# Patient Record
Sex: FEMALE | Race: WHITE | NOT HISPANIC OR LATINO | Employment: PART TIME | URBAN - METROPOLITAN AREA
[De-identification: names, ages, dates, MRNs, and addresses within clinical notes are randomized per-mention and may not be internally consistent; named-entity substitution may affect disease eponyms.]

---

## 2018-12-29 ENCOUNTER — DOCUMENTATION (OUTPATIENT)
Dept: FAMILY MEDICINE CLINIC | Facility: CLINIC | Age: 63
End: 2018-12-29

## 2020-06-11 ENCOUNTER — HOSPITAL ENCOUNTER (EMERGENCY)
Facility: HOSPITAL | Age: 65
Discharge: HOME | End: 2020-06-11
Attending: EMERGENCY MEDICINE | Admitting: EMERGENCY MEDICINE

## 2020-06-11 ENCOUNTER — APPOINTMENT (OUTPATIENT)
Dept: GENERAL RADIOLOGY | Facility: HOSPITAL | Age: 65
End: 2020-06-11

## 2020-06-11 VITALS
HEART RATE: 93 BPM | HEIGHT: 66 IN | DIASTOLIC BLOOD PRESSURE: 111 MMHG | SYSTOLIC BLOOD PRESSURE: 181 MMHG | RESPIRATION RATE: 18 BRPM | OXYGEN SATURATION: 96 % | TEMPERATURE: 97.8 F | WEIGHT: 179.2 LBS | BODY MASS INDEX: 28.8 KG/M2

## 2020-06-11 DIAGNOSIS — F41.9 ANXIETY: ICD-10-CM

## 2020-06-11 DIAGNOSIS — R00.2 PALPITATIONS: Primary | ICD-10-CM

## 2020-06-11 LAB
ALBUMIN SERPL-MCNC: 4.2 G/DL (ref 3.5–5.2)
ALBUMIN/GLOB SERPL: 1.3 G/DL
ALP SERPL-CCNC: 83 U/L (ref 39–117)
ALT SERPL W P-5'-P-CCNC: 100 U/L (ref 1–33)
AMPHET+METHAMPHET UR QL: NEGATIVE
ANION GAP SERPL CALCULATED.3IONS-SCNC: 14.2 MMOL/L (ref 5–15)
AST SERPL-CCNC: 101 U/L (ref 1–32)
BARBITURATES UR QL SCN: NEGATIVE
BASOPHILS # BLD AUTO: 0.04 10*3/MM3 (ref 0–0.2)
BASOPHILS NFR BLD AUTO: 0.5 % (ref 0–1.5)
BENZODIAZ UR QL SCN: NEGATIVE
BILIRUB SERPL-MCNC: 0.5 MG/DL (ref 0.2–1.2)
BUN BLD-MCNC: 13 MG/DL (ref 8–23)
BUN/CREAT SERPL: 18.6 (ref 7–25)
CALCIUM SPEC-SCNC: 9 MG/DL (ref 8.6–10.5)
CANNABINOIDS SERPL QL: NEGATIVE
CHLORIDE SERPL-SCNC: 105 MMOL/L (ref 98–107)
CO2 SERPL-SCNC: 20.8 MMOL/L (ref 22–29)
COCAINE UR QL: NEGATIVE
CREAT BLD-MCNC: 0.7 MG/DL (ref 0.57–1)
DEPRECATED RDW RBC AUTO: 50.1 FL (ref 37–54)
EOSINOPHIL # BLD AUTO: 0.17 10*3/MM3 (ref 0–0.4)
EOSINOPHIL NFR BLD AUTO: 2.3 % (ref 0.3–6.2)
ERYTHROCYTE [DISTWIDTH] IN BLOOD BY AUTOMATED COUNT: 13.2 % (ref 12.3–15.4)
ETHANOL BLD-MCNC: <10 MG/DL (ref 0–10)
ETHANOL UR QL: <0.01 %
GFR SERPL CREATININE-BSD FRML MDRD: 84 ML/MIN/1.73
GLOBULIN UR ELPH-MCNC: 3.2 GM/DL
GLUCOSE BLD-MCNC: 129 MG/DL (ref 65–99)
HCT VFR BLD AUTO: 42.1 % (ref 34–46.6)
HGB BLD-MCNC: 14.2 G/DL (ref 12–15.9)
IMM GRANULOCYTES # BLD AUTO: 0.03 10*3/MM3 (ref 0–0.05)
IMM GRANULOCYTES NFR BLD AUTO: 0.4 % (ref 0–0.5)
LYMPHOCYTES # BLD AUTO: 1.3 10*3/MM3 (ref 0.7–3.1)
LYMPHOCYTES NFR BLD AUTO: 17.3 % (ref 19.6–45.3)
MCH RBC QN AUTO: 34.4 PG (ref 26.6–33)
MCHC RBC AUTO-ENTMCNC: 33.7 G/DL (ref 31.5–35.7)
MCV RBC AUTO: 101.9 FL (ref 79–97)
METHADONE UR QL SCN: NEGATIVE
MONOCYTES # BLD AUTO: 0.52 10*3/MM3 (ref 0.1–0.9)
MONOCYTES NFR BLD AUTO: 6.9 % (ref 5–12)
NEUTROPHILS # BLD AUTO: 5.44 10*3/MM3 (ref 1.7–7)
NEUTROPHILS NFR BLD AUTO: 72.6 % (ref 42.7–76)
NRBC BLD AUTO-RTO: 0 /100 WBC (ref 0–0.2)
OPIATES UR QL: NEGATIVE
OXYCODONE UR QL SCN: NEGATIVE
PLATELET # BLD AUTO: 269 10*3/MM3 (ref 140–450)
PMV BLD AUTO: 9.5 FL (ref 6–12)
POTASSIUM BLD-SCNC: 4.2 MMOL/L (ref 3.5–5.2)
PROT SERPL-MCNC: 7.4 G/DL (ref 6–8.5)
RBC # BLD AUTO: 4.13 10*6/MM3 (ref 3.77–5.28)
SODIUM BLD-SCNC: 140 MMOL/L (ref 136–145)
TROPONIN T SERPL-MCNC: <0.01 NG/ML (ref 0–0.03)
WBC NRBC COR # BLD: 7.5 10*3/MM3 (ref 3.4–10.8)
WHOLE BLOOD HOLD SPECIMEN: NORMAL

## 2020-06-11 PROCEDURE — 80053 COMPREHEN METABOLIC PANEL: CPT | Performed by: EMERGENCY MEDICINE

## 2020-06-11 PROCEDURE — 80307 DRUG TEST PRSMV CHEM ANLYZR: CPT | Performed by: EMERGENCY MEDICINE

## 2020-06-11 PROCEDURE — 93010 ELECTROCARDIOGRAM REPORT: CPT | Performed by: INTERNAL MEDICINE

## 2020-06-11 PROCEDURE — 84484 ASSAY OF TROPONIN QUANT: CPT | Performed by: EMERGENCY MEDICINE

## 2020-06-11 PROCEDURE — 71046 X-RAY EXAM CHEST 2 VIEWS: CPT

## 2020-06-11 PROCEDURE — 93005 ELECTROCARDIOGRAM TRACING: CPT | Performed by: EMERGENCY MEDICINE

## 2020-06-11 PROCEDURE — 90791 PSYCH DIAGNOSTIC EVALUATION: CPT | Performed by: SOCIAL WORKER

## 2020-06-11 PROCEDURE — 99283 EMERGENCY DEPT VISIT LOW MDM: CPT

## 2020-06-11 PROCEDURE — 85025 COMPLETE CBC W/AUTO DIFF WBC: CPT | Performed by: EMERGENCY MEDICINE

## 2020-06-11 PROCEDURE — 36415 COLL VENOUS BLD VENIPUNCTURE: CPT

## 2020-06-11 RX ORDER — LORAZEPAM 1 MG/1
0.5 TABLET ORAL ONCE
Status: COMPLETED | OUTPATIENT
Start: 2020-06-11 | End: 2020-06-11

## 2020-06-11 RX ADMIN — LORAZEPAM 0.5 MG: 1 TABLET ORAL at 13:01

## 2020-06-11 NOTE — ED PROVIDER NOTES
EMERGENCY DEPARTMENT ENCOUNTER    Room Number:  10/10  PCP: Rocío Cao MD  Historian: Patient  History Limited By: Nothing      HPI  Chief Complaint: Palpitations panic attack  Context: Emilia Squires is a 64 y.o. female who presents to the ED c/o palpitations and panic attack.  Patient states her air conditioner has been broken for a week and just found out that her electricity now does not work.  She states she has been increasingly stressed out.  Some mild palpitations and feels like her chest has tension.  Patient also experiencing the anniversary of her child's death.  No swelling in her legs.  No fevers.      Location: Central chest  Radiation: None  Character: Tension  Duration: 8:00  Severity: Mild  Progression: None  Aggravating Factors: Nothing   Alleviating Factors: Nothing        MEDICAL RECORD REVIEW    Has been seen here for cat bite in the past          PAST MEDICAL HISTORY  Active Ambulatory Problems     Diagnosis Date Noted   • No Active Ambulatory Problems     Resolved Ambulatory Problems     Diagnosis Date Noted   • No Resolved Ambulatory Problems     Past Medical History:   Diagnosis Date   • Acquired coagulation factor deficiency (CMS/HCC)    • Alcohol abuse    • Depression    • Diverticulitis    • Hypertension          PAST SURGICAL HISTORY  Past Surgical History:   Procedure Laterality Date   • ILEOSTOMY CLOSURE           FAMILY HISTORY  History reviewed. No pertinent family history.      SOCIAL HISTORY  Social History     Socioeconomic History   • Marital status:      Spouse name: Not on file   • Number of children: Not on file   • Years of education: Not on file   • Highest education level: Not on file   Tobacco Use   • Smoking status: Never Smoker   Substance and Sexual Activity   • Alcohol use: Not Currently   • Drug use: Never   • Sexual activity: Defer         ALLERGIES  Penicillins        REVIEW OF SYSTEMS  Review of Systems   Constitutional: Negative for fever.   HENT:  Negative for sore throat.    Eyes: Negative.    Respiratory: Positive for chest tightness. Negative for cough and shortness of breath.    Cardiovascular: Positive for palpitations. Negative for chest pain.   Gastrointestinal: Negative for abdominal pain, diarrhea and vomiting.   Genitourinary: Negative for dysuria.   Musculoskeletal: Negative for neck pain.   Skin: Negative for rash.   Allergic/Immunologic: Negative.    Neurological: Negative for weakness, numbness and headaches.   Hematological: Negative.    Psychiatric/Behavioral: The patient is nervous/anxious.    All other systems reviewed and are negative.           PHYSICAL EXAM  ED Triage Vitals   Temp Heart Rate Resp BP SpO2   06/11/20 1150 06/11/20 1150 06/11/20 1150 06/11/20 1220 06/11/20 1150   97.8 °F (36.6 °C) 103 18 (!) 192/108 96 %      Temp src Heart Rate Source Patient Position BP Location FiO2 (%)   06/11/20 1150 06/11/20 1150 -- -- --   Tympanic Monitor          Physical Exam   Constitutional: She is oriented to person, place, and time. No distress.   HENT:   Head: Normocephalic and atraumatic.   Eyes: Pupils are equal, round, and reactive to light. EOM are normal.   Neck: Normal range of motion. Neck supple.   Cardiovascular: Normal rate, regular rhythm and normal heart sounds.   Pulmonary/Chest: Effort normal and breath sounds normal. No respiratory distress.   Abdominal: Soft. There is no tenderness. There is no rebound and no guarding.   Musculoskeletal: Normal range of motion. She exhibits no edema.   Neurological: She is alert and oriented to person, place, and time. She has normal sensation and normal strength.   Skin: Skin is warm and dry. No rash noted.   Psychiatric: Mood and affect normal.   Nursing note and vitals reviewed.          LAB RESULTS  Recent Results (from the past 24 hour(s))   CBC Auto Differential    Collection Time: 06/11/20 12:49 PM   Result Value Ref Range    WBC 7.50 3.40 - 10.80 10*3/mm3    RBC 4.13 3.77 - 5.28  10*6/mm3    Hemoglobin 14.2 12.0 - 15.9 g/dL    Hematocrit 42.1 34.0 - 46.6 %    .9 (H) 79.0 - 97.0 fL    MCH 34.4 (H) 26.6 - 33.0 pg    MCHC 33.7 31.5 - 35.7 g/dL    RDW 13.2 12.3 - 15.4 %    RDW-SD 50.1 37.0 - 54.0 fl    MPV 9.5 6.0 - 12.0 fL    Platelets 269 140 - 450 10*3/mm3    Neutrophil % 72.6 42.7 - 76.0 %    Lymphocyte % 17.3 (L) 19.6 - 45.3 %    Monocyte % 6.9 5.0 - 12.0 %    Eosinophil % 2.3 0.3 - 6.2 %    Basophil % 0.5 0.0 - 1.5 %    Immature Grans % 0.4 0.0 - 0.5 %    Neutrophils, Absolute 5.44 1.70 - 7.00 10*3/mm3    Lymphocytes, Absolute 1.30 0.70 - 3.10 10*3/mm3    Monocytes, Absolute 0.52 0.10 - 0.90 10*3/mm3    Eosinophils, Absolute 0.17 0.00 - 0.40 10*3/mm3    Basophils, Absolute 0.04 0.00 - 0.20 10*3/mm3    Immature Grans, Absolute 0.03 0.00 - 0.05 10*3/mm3    nRBC 0.0 0.0 - 0.2 /100 WBC   Comprehensive Metabolic Panel    Collection Time: 06/11/20 12:50 PM   Result Value Ref Range    Glucose 129 (H) 65 - 99 mg/dL    BUN 13 8 - 23 mg/dL    Creatinine 0.70 0.57 - 1.00 mg/dL    Sodium 140 136 - 145 mmol/L    Potassium 4.2 3.5 - 5.2 mmol/L    Chloride 105 98 - 107 mmol/L    CO2 20.8 (L) 22.0 - 29.0 mmol/L    Calcium 9.0 8.6 - 10.5 mg/dL    Total Protein 7.4 6.0 - 8.5 g/dL    Albumin 4.20 3.50 - 5.20 g/dL    ALT (SGPT) 100 (H) 1 - 33 U/L    AST (SGOT) 101 (H) 1 - 32 U/L    Alkaline Phosphatase 83 39 - 117 U/L    Total Bilirubin 0.5 0.2 - 1.2 mg/dL    eGFR Non African Amer 84 >60 mL/min/1.73    Globulin 3.2 gm/dL    A/G Ratio 1.3 g/dL    BUN/Creatinine Ratio 18.6 7.0 - 25.0    Anion Gap 14.2 5.0 - 15.0 mmol/L   Troponin    Collection Time: 06/11/20 12:50 PM   Result Value Ref Range    Troponin T <0.010 0.000 - 0.030 ng/mL   Ethanol    Collection Time: 06/11/20 12:50 PM   Result Value Ref Range    Ethanol <10 0 - 10 mg/dL    Ethanol % <0.010 %   Light Blue Top    Collection Time: 06/11/20 12:50 PM   Result Value Ref Range    Extra Tube hold for add-on    Urine Drug Screen - Urine, Clean Catch     Collection Time: 06/11/20  1:02 PM   Result Value Ref Range    Amphet/Methamphet, Screen Negative Negative    Barbiturates Screen, Urine Negative Negative    Benzodiazepine Screen, Urine Negative Negative    Cocaine Screen, Urine Negative Negative    Opiate Screen Negative Negative    THC, Screen, Urine Negative Negative    Methadone Screen, Urine Negative Negative    Oxycodone Screen, Urine Negative Negative       Ordered the above labs and reviewed the results.        RADIOLOGY  XR Chest 2 View   Final Result   No acute process.       This report was finalized on 6/11/2020 1:38 PM by Dr. Danilo Hernández M.D.               Ordered the above noted radiological studies. Reviewed by me in PACS.          PROCEDURES  Procedures      EKG:          EKG time: 1302  Rhythm/Rate: Normal sinus rhythm 94 occasional PVC  P waves and RI: Normal P wave  QRS, axis: Normal QRS  ST and T waves: Normal ST-T waves    Interpreted Contemporaneously by me, independently viewed  Unchanged compared to prior 3/18/2013        MEDICATIONS GIVEN IN ER  Medications   LORazepam (ATIVAN) tablet 0.5 mg (0.5 mg Oral Given 6/11/20 1301)             PROGRESS AND CONSULTS  ED Course as of Jun 11 1535   Thu Jun 11, 2020   1520 15:20  Patient here for what she describes as a panic attack.  Patient does sound to be under significant stress.  Patient's work-up here is been normal.  Patient currently being seen by access.    [SL]   1532 15:32  Seen by access and she will be given outpatient information.    [SL]      ED Course User Index  [SL] Magdi Conway MD           MEDICAL DECISION MAKING      MDM  Number of Diagnoses or Management Options  Anxiety:   Palpitations:      Amount and/or Complexity of Data Reviewed  Clinical lab tests: ordered and reviewed (Troponin negative)  Tests in the radiology section of CPT®: ordered and reviewed (cxr negative)               DIAGNOSIS  Final diagnoses:   Palpitations   Anxiety            DISPOSITION  DISCHARGE    Patient discharged in stable condition.    Reviewed implications of results, diagnosis, meds, responsibility to follow up, warning signs and symptoms of possible worsening, potential complications and reasons to return to ER, including worsening chest pain, shortness of breath.    Patient/Family voiced understanding of above instructions.    Discussed plan for discharge, as there is no emergent indication for admission. Patient referred to primary care provider for BP management due to today's BP. Pt/family is agreeable and understands need for follow up and repeat testing.  Pt is aware that discharge does not mean that nothing is wrong but it indicates no emergency is present that requires admission and they must continue care with follow-up as given below or physician of their choice.     FOLLOW-UP  Rocío Cao MD  [address] [id number] [phone]    Schedule an appointment as soon as possible for a visit            Medication List      No changes were made to your prescriptions during this visit.             Latest Documented Vital Signs:  As of 15:35  BP- (!) 181/111 HR- 93 Temp- 97.8 °F (36.6 °C) (Tympanic) O2 sat- 96%                     Magdi Conway MD  06/11/20 3405

## 2020-06-11 NOTE — ED NOTES
"Pt to ED ambulatory with steady gait from home with c/o anxiety. 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 Does not like BP cuff on, told will intermittently check PRN. Pt reports also has been waking up at night and unable to sleep. Took husbands Rx celebrex this am with no relief. Pt asked to keep door open but encouraged to leave closed d/t anxiousness, did inform pt she could leave mask off ifno staff in the room and asked to put back in place during interactions. Pt agreed.   Dequanies JESSE, CP, coivd s/s.      Nancy Early, RN  20 1230    Patient was placed in face mask in first look. Patient was wearing facemask when I entered the room and throughout our encounter. I wore full protective equipment throughout this patient encounter including a face mask, eye shield, and gloves. Hand hygiene was performed before donning protective equipment and after removal when leaving the room.       Nancy Early RN  20 1230       Nancy Early RN  20 1231    "

## 2020-06-11 NOTE — ED NOTES
"Upon entering room pt was standing, still pacing, asked for something for nerves and states \"i'm gonna need to walk somewhere\". Pt informed unable to allow pt to leave room for safety and privacy issues but offered to allow pt to walk to bathroom to given urine specimen. But would otherwise have to stay in her room. Dr kinney notified of request for meds.      Nancy Early RN  06/11/20 [id number]    "

## 2020-06-11 NOTE — ED NOTES
"Pt presents to ED from home. Pt presents to ED with complaints of \" I think I had a panic attack this morning. My AC and Electricity have been out for a week.\" Pt states she has had a lot of increased stress at home and work. Pt denies SI/HI at this time and states she is also having difficulty sleeping. Pt is currently A&OX4, ambulated into triage, and in a mask.      Celia Dang, RN  06/11/20 5059    "

## 2020-06-11 NOTE — CONSULTS
65 y/o cauc  mother of 2 brought to the ED by her daughter as pt was afraid to drive. She states that she was more anxious and w/u at 0400 this am and could not go back to sleep after trying to do some chores around the house.  She denies any SI or HI.  She states she has struggled to deal w/ her sons drug OD 7 years ago.  She reports after that she began consuming wine more. She is up to 3 glasses at night. She states that her  has expressed concerns. She is also isolating more.  She has gained 40 lbs in that time periods as well.  She denies use of illicit drugs.  Patient denies hx of ETOH w/ drawal, blackouts.     Patient has seen SARIAH Larry in the past. She has been tried on Prozac, Effexor and felt that Pristiq work but quit taking. She has never seen a therapist.    Patient lives w/ her . He son OD'd on heroin. She and  have been  42 years.  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  This has become mundane for her.     Patient was alert and O x 4.  NO SI or HI.  No a/v hallucinations. Speech was appropriate.  Mood and affect were congrent.  Discussed coping skills and treatment options. She will contact Ms Cao's office and try to set up an appointment. She will also seek to get in to see a therapist to help her develop coping skills. Explored ways that the ETOH use can caused increased depression and delay the grief process.  Thus, emphasizing the need for therapy. She was appreciative of the information.  Discussed case w/ Dr. Conway.  She is being discharged.

## 2022-08-30 PROBLEM — I10 BENIGN ESSENTIAL HTN: Status: ACTIVE | Noted: 2022-08-30

## 2022-08-30 PROBLEM — IMO0001 BLUES: Status: ACTIVE | Noted: 2022-08-30

## 2022-08-30 PROBLEM — F10.10 AA (ALCOHOL ABUSE): Status: ACTIVE | Noted: 2022-08-30

## 2022-10-13 ENCOUNTER — HOSPITAL ENCOUNTER (OUTPATIENT)
Facility: HOSPITAL | Age: 67
Setting detail: OBSERVATION
Discharge: HOME | End: 2022-10-14
Attending: EMERGENCY MEDICINE | Admitting: EMERGENCY MEDICINE

## 2022-10-13 ENCOUNTER — APPOINTMENT (OUTPATIENT)
Dept: CT IMAGING | Facility: HOSPITAL | Age: 67
End: 2022-10-13

## 2022-10-13 ENCOUNTER — APPOINTMENT (OUTPATIENT)
Dept: GENERAL RADIOLOGY | Facility: HOSPITAL | Age: 67
End: 2022-10-13

## 2022-10-13 ENCOUNTER — OFFICE VISIT (OUTPATIENT)
Dept: INTERNAL MEDICINE | Facility: CLINIC | Age: 67
End: 2022-10-13

## 2022-10-13 ENCOUNTER — APPOINTMENT (OUTPATIENT)
Dept: ULTRASOUND IMAGING | Facility: HOSPITAL | Age: 67
End: 2022-10-13

## 2022-10-13 VITALS
HEART RATE: 134 BPM | OXYGEN SATURATION: 98 % | TEMPERATURE: 97.4 F | DIASTOLIC BLOOD PRESSURE: 80 MMHG | BODY MASS INDEX: 39.11 KG/M2 | SYSTOLIC BLOOD PRESSURE: 142 MMHG | HEIGHT: 55 IN | WEIGHT: 169 LBS

## 2022-10-13 DIAGNOSIS — E87.1 HYPONATREMIA: ICD-10-CM

## 2022-10-13 DIAGNOSIS — R93.89 ABNORMAL CT SCAN: ICD-10-CM

## 2022-10-13 DIAGNOSIS — R73.9 HYPERGLYCEMIA: ICD-10-CM

## 2022-10-13 DIAGNOSIS — R00.0 TACHYCARDIA: ICD-10-CM

## 2022-10-13 DIAGNOSIS — R73.09 ELEVATED GLUCOSE: Primary | ICD-10-CM

## 2022-10-13 DIAGNOSIS — I10 BENIGN ESSENTIAL HTN: ICD-10-CM

## 2022-10-13 DIAGNOSIS — E11.9 DIABETES MELLITUS, NEW ONSET: Primary | ICD-10-CM

## 2022-10-13 DIAGNOSIS — R10.84 GENERALIZED ABDOMINAL PAIN: ICD-10-CM

## 2022-10-13 DIAGNOSIS — R10.31 RLQ ABDOMINAL PAIN: ICD-10-CM

## 2022-10-13 LAB
ALBUMIN SERPL-MCNC: 4.3 G/DL (ref 3.5–5.2)
ALBUMIN/GLOB SERPL: 1.4 G/DL
ALP SERPL-CCNC: 91 U/L (ref 39–117)
ALT SERPL W P-5'-P-CCNC: 41 U/L (ref 1–33)
ANION GAP SERPL CALCULATED.3IONS-SCNC: 15.7 MMOL/L (ref 5–15)
AST SERPL-CCNC: 35 U/L (ref 1–32)
ATMOSPHERIC PRESS: 745.1 MMHG
BASE EXCESS BLDV CALC-SCNC: -1.9 MMOL/L (ref -2–2)
BASOPHILS # BLD AUTO: 0.04 10*3/MM3 (ref 0–0.2)
BASOPHILS NFR BLD AUTO: 0.5 % (ref 0–1.5)
BDY SITE: ABNORMAL
BILIRUB SERPL-MCNC: 0.7 MG/DL (ref 0–1.2)
BILIRUB UR QL STRIP: NEGATIVE
BUN SERPL-MCNC: 18 MG/DL (ref 8–23)
BUN/CREAT SERPL: 18.4 (ref 7–25)
CALCIUM SPEC-SCNC: 9.2 MG/DL (ref 8.6–10.5)
CHLORIDE SERPL-SCNC: 88 MMOL/L (ref 98–107)
CLARITY UR: ABNORMAL
CO2 SERPL-SCNC: 24.3 MMOL/L (ref 22–29)
COLOR UR: YELLOW
CREAT SERPL-MCNC: 0.98 MG/DL (ref 0.57–1)
DEPRECATED RDW RBC AUTO: 46.3 FL (ref 37–54)
EGFRCR SERPLBLD CKD-EPI 2021: 63.4 ML/MIN/1.73
EOSINOPHIL # BLD AUTO: 0.08 10*3/MM3 (ref 0–0.4)
EOSINOPHIL NFR BLD AUTO: 0.9 % (ref 0.3–6.2)
ERYTHROCYTE [DISTWIDTH] IN BLOOD BY AUTOMATED COUNT: 12.1 % (ref 12.3–15.4)
GLOBULIN UR ELPH-MCNC: 3 GM/DL
GLUCOSE BLDC GLUCOMTR-MCNC: 326 MG/DL (ref 70–130)
GLUCOSE BLDC GLUCOMTR-MCNC: 409 MG/DL (ref 70–130)
GLUCOSE SERPL-MCNC: 490 MG/DL (ref 65–99)
GLUCOSE UR STRIP-MCNC: ABNORMAL MG/DL
HBA1C MFR BLD: 10.2 % (ref 4.8–5.6)
HCO3 BLDV-SCNC: 24.7 MMOL/L (ref 22–28)
HCT VFR BLD AUTO: 49 % (ref 34–46.6)
HGB BLD-MCNC: 17 G/DL (ref 12–15.9)
HGB UR QL STRIP.AUTO: NEGATIVE
HOLD SPECIMEN: NORMAL
HOLD SPECIMEN: NORMAL
IMM GRANULOCYTES # BLD AUTO: 0.05 10*3/MM3 (ref 0–0.05)
IMM GRANULOCYTES NFR BLD AUTO: 0.6 % (ref 0–0.5)
KETONES UR QL STRIP: ABNORMAL
LEUKOCYTE ESTERASE UR QL STRIP.AUTO: NEGATIVE
LIPASE SERPL-CCNC: 63 U/L (ref 13–60)
LYMPHOCYTES # BLD AUTO: 1.53 10*3/MM3 (ref 0.7–3.1)
LYMPHOCYTES NFR BLD AUTO: 17.7 % (ref 19.6–45.3)
MCH RBC QN AUTO: 36.2 PG (ref 26.6–33)
MCHC RBC AUTO-ENTMCNC: 34.7 G/DL (ref 31.5–35.7)
MCV RBC AUTO: 104.3 FL (ref 79–97)
MODALITY: ABNORMAL
MONOCYTES # BLD AUTO: 0.78 10*3/MM3 (ref 0.1–0.9)
MONOCYTES NFR BLD AUTO: 9 % (ref 5–12)
NEUTROPHILS NFR BLD AUTO: 6.17 10*3/MM3 (ref 1.7–7)
NEUTROPHILS NFR BLD AUTO: 71.3 % (ref 42.7–76)
NITRITE UR QL STRIP: NEGATIVE
NRBC BLD AUTO-RTO: 0 /100 WBC (ref 0–0.2)
PCO2 BLDV: 47.2 MM HG (ref 41–51)
PH BLDV: 7.33 PH UNITS (ref 7.31–7.41)
PH UR STRIP.AUTO: <=5 [PH] (ref 5–8)
PLATELET # BLD AUTO: 318 10*3/MM3 (ref 140–450)
PMV BLD AUTO: 9.8 FL (ref 6–12)
PO2 BLDV: 16.6 MM HG (ref 35–45)
POTASSIUM SERPL-SCNC: 4.5 MMOL/L (ref 3.5–5.2)
PROT SERPL-MCNC: 7.3 G/DL (ref 6–8.5)
PROT UR QL STRIP: NEGATIVE
QT INTERVAL: 320 MS
RBC # BLD AUTO: 4.7 10*6/MM3 (ref 3.77–5.28)
SAO2 % BLDCOA: 20.2 % (ref 92–99)
SARS-COV-2 RNA RESP QL NAA+PROBE: NOT DETECTED
SODIUM SERPL-SCNC: 128 MMOL/L (ref 136–145)
SP GR UR STRIP: >=1.03 (ref 1–1.03)
TOTAL RATE: 18 BREATHS/MINUTE
TROPONIN T SERPL-MCNC: <0.01 NG/ML (ref 0–0.03)
TSH SERPL DL<=0.05 MIU/L-ACNC: 2.56 UIU/ML (ref 0.27–4.2)
UROBILINOGEN UR QL STRIP: ABNORMAL
WBC NRBC COR # BLD: 8.65 10*3/MM3 (ref 3.4–10.8)
WHOLE BLOOD HOLD COAG: NORMAL
WHOLE BLOOD HOLD SPECIMEN: NORMAL

## 2022-10-13 PROCEDURE — 76856 US EXAM PELVIC COMPLETE: CPT

## 2022-10-13 PROCEDURE — G0378 HOSPITAL OBSERVATION PER HR: HCPCS

## 2022-10-13 PROCEDURE — 74177 CT ABD & PELVIS W/CONTRAST: CPT

## 2022-10-13 PROCEDURE — 63710000001 INSULIN REGULAR HUMAN PER 5 UNITS: Performed by: PHYSICIAN ASSISTANT

## 2022-10-13 PROCEDURE — 99203 OFFICE O/P NEW LOW 30 MIN: CPT | Performed by: FAMILY MEDICINE

## 2022-10-13 PROCEDURE — U0003 INFECTIOUS AGENT DETECTION BY NUCLEIC ACID (DNA OR RNA); SEVERE ACUTE RESPIRATORY SYNDROME CORONAVIRUS 2 (SARS-COV-2) (CORONAVIRUS DISEASE [COVID-19]), AMPLIFIED PROBE TECHNIQUE, MAKING USE OF HIGH THROUGHPUT TECHNOLOGIES AS DESCRIBED BY CMS-2020-01-R: HCPCS | Performed by: PHYSICIAN ASSISTANT

## 2022-10-13 PROCEDURE — 99284 EMERGENCY DEPT VISIT MOD MDM: CPT

## 2022-10-13 PROCEDURE — 93010 ELECTROCARDIOGRAM REPORT: CPT | Performed by: INTERNAL MEDICINE

## 2022-10-13 PROCEDURE — 36415 COLL VENOUS BLD VENIPUNCTURE: CPT

## 2022-10-13 PROCEDURE — 82803 BLOOD GASES ANY COMBINATION: CPT

## 2022-10-13 PROCEDURE — 93005 ELECTROCARDIOGRAM TRACING: CPT

## 2022-10-13 PROCEDURE — 83036 HEMOGLOBIN GLYCOSYLATED A1C: CPT | Performed by: NURSE PRACTITIONER

## 2022-10-13 PROCEDURE — 81003 URINALYSIS AUTO W/O SCOPE: CPT | Performed by: PHYSICIAN ASSISTANT

## 2022-10-13 PROCEDURE — 71045 X-RAY EXAM CHEST 1 VIEW: CPT

## 2022-10-13 PROCEDURE — 80050 GENERAL HEALTH PANEL: CPT

## 2022-10-13 PROCEDURE — 93000 ELECTROCARDIOGRAM COMPLETE: CPT | Performed by: FAMILY MEDICINE

## 2022-10-13 PROCEDURE — 83690 ASSAY OF LIPASE: CPT | Performed by: PHYSICIAN ASSISTANT

## 2022-10-13 PROCEDURE — 84484 ASSAY OF TROPONIN QUANT: CPT | Performed by: PHYSICIAN ASSISTANT

## 2022-10-13 PROCEDURE — C9803 HOPD COVID-19 SPEC COLLECT: HCPCS

## 2022-10-13 PROCEDURE — 82962 GLUCOSE BLOOD TEST: CPT

## 2022-10-13 PROCEDURE — 25010000002 IOPAMIDOL 61 % SOLUTION: Performed by: EMERGENCY MEDICINE

## 2022-10-13 PROCEDURE — 96360 HYDRATION IV INFUSION INIT: CPT

## 2022-10-13 RX ORDER — SODIUM CHLORIDE 0.9 % (FLUSH) 0.9 %
10 SYRINGE (ML) INJECTION EVERY 12 HOURS SCHEDULED
Status: DISCONTINUED | OUTPATIENT
Start: 2022-10-13 | End: 2022-10-14 | Stop reason: HOSPADM

## 2022-10-13 RX ORDER — NITROGLYCERIN 0.4 MG/1
0.4 TABLET SUBLINGUAL
Status: DISCONTINUED | OUTPATIENT
Start: 2022-10-13 | End: 2022-10-14 | Stop reason: HOSPADM

## 2022-10-13 RX ORDER — SODIUM CHLORIDE 0.9 % (FLUSH) 0.9 %
10 SYRINGE (ML) INJECTION AS NEEDED
Status: DISCONTINUED | OUTPATIENT
Start: 2022-10-13 | End: 2022-10-14 | Stop reason: HOSPADM

## 2022-10-13 RX ORDER — ONDANSETRON 4 MG/1
4 TABLET, FILM COATED ORAL EVERY 6 HOURS PRN
Status: DISCONTINUED | OUTPATIENT
Start: 2022-10-13 | End: 2022-10-14 | Stop reason: HOSPADM

## 2022-10-13 RX ORDER — ONDANSETRON 2 MG/ML
4 INJECTION INTRAMUSCULAR; INTRAVENOUS EVERY 6 HOURS PRN
Status: DISCONTINUED | OUTPATIENT
Start: 2022-10-13 | End: 2022-10-14 | Stop reason: HOSPADM

## 2022-10-13 RX ADMIN — Medication 10 ML: at 21:50

## 2022-10-13 RX ADMIN — IOPAMIDOL 85 ML: 612 INJECTION, SOLUTION INTRAVENOUS at 18:59

## 2022-10-13 RX ADMIN — INSULIN HUMAN 6 UNITS: 100 INJECTION, SOLUTION PARENTERAL at 20:15

## 2022-10-13 RX ADMIN — SODIUM CHLORIDE 1000 ML: 9 INJECTION, SOLUTION INTRAVENOUS at 18:17

## 2022-10-13 RX ADMIN — SODIUM CHLORIDE, POTASSIUM CHLORIDE, SODIUM LACTATE AND CALCIUM CHLORIDE 1000 ML: 600; 310; 30; 20 INJECTION, SOLUTION INTRAVENOUS at 21:44

## 2022-10-13 NOTE — PROGRESS NOTES
"    Chief Complaint  Establish Care, Abdominal Pain, Blood Sugar Problem, and Anxiety    Subjective    History of Present Illness {CC  Problem List  Visit  Diagnosis   Encounters  Notes  Medications  Labs  Result Review Imaging  Media :23}     Emilia Squires presents to [facility] for   History of Present Illness     68 yo female present to establish care. She is New to me and this office. She previously seen by Dr. Rocío Cao a few years ago. She states she is only taking Actifed daily and flagyl given recently, not taking any other medication at this time.    She drinks 1 bottle of chardoney daily, drinking daily for years.   She denies previous history of high blood pressure.      She has had abdominal pain for the last couple of weeks.  She has history of diverticulitis.   Dull pain   No blood in stool or dark stools  No change in color or smell of urine  No nausea or vomitin.       Social History     Socioeconomic History   • Marital status:    Tobacco Use   • Smoking status: Never   • Smokeless tobacco: Never   Vaping Use   • Vaping Use: Never used   Substance and Sexual Activity   • Alcohol use: Yes     Alcohol/week: 2.0 - 3.0 standard drinks     Types: 2 - 3 Glasses of wine per week     Comment: daily wine   • Drug use: Never   • Sexual activity: Defer      Objective     Vital Signs:   /80   Pulse (!) 134   Temp 97.4 °F (36.3 °C)   Ht 63 cm (24.8\")   Wt 76.7 kg (169 lb)   SpO2 98%   .14 kg/m²   Physical Exam  Constitutional:       General: She is not in acute distress.     Appearance: She is not ill-appearing.   HENT:      Head: Normocephalic.      Mouth/Throat:      Mouth: Mucous membranes are moist.   Eyes:      Conjunctiva/sclera: Conjunctivae normal.   Cardiovascular:      Rate and Rhythm: Regular rhythm.      Heart sounds: Normal heart sounds.   Pulmonary:      Effort: No respiratory distress.      Breath sounds: Normal breath " sounds. No wheezing.   Abdominal:      General: There is no distension.      Palpations: Abdomen is soft.      Tenderness: There is abdominal tenderness. There is no guarding or rebound.      Comments: RL Q    Musculoskeletal:      Right lower leg: No edema.      Left lower leg: No edema.   Neurological:      Mental Status: She is alert.        Result Review  Data Reviewed:{ Labs  Result Review  Imaging  Med Tab  Media :23}   The following data was reviewed by: Lisa Rascon MD on 10/13/2022  No results for input(s): GLU, BUN, CREATININE, EGFRIFNONA, EGFRIFAFRI, NA, K, CL, CALCIUM, ALBUMIN, BILITOT, ALKPHOS, AST, ALT, CHLPL, TRIG, HDL, VLDL, LDL, LDLHDL, CKTOTAL, HGBA1C, MICROALBUR, WBC, RBC, HB, HCT, MCV, MCH, TSH, FREET4, PSA, INR, [id number], URICACID in the last 12555 hours.    Invalid input(s): PROTEINTOTREF, PLATELETS           Current Outpatient Medications:   •  Cholecalciferol (VITAMIN D3) 5000 UNITS capsule capsule, Take  by mouth., Disp: , Rfl:   •  Multiple Vitamin (MULTI VITAMIN DAILY PO), Take  by mouth., Disp: , Rfl:   •  Triprolidine-Pseudoephedrine (ACTIFED PO), Take  by mouth daily., Disp: , Rfl:       Assessment and Plan {CC Problem List  Visit Diagnosis  ROS  Review (Popup)  Health Maintenance  Quality  BestPractice  Medications  SmartSets  SnapShot Encounters  Media :23}   Problem List Items Addressed This Visit        Cardiac and Vasculature    Benign essential HTN    Relevant Orders    Comprehensive metabolic panel   Other Visit Diagnoses     Elevated glucose    -  Primary    Relevant Orders    Hemoglobin A1c    Generalized abdominal pain        Relevant Orders    Comprehensive metabolic panel    CBC No Differential    Urinalysis without microscopic (no culture) - Urine, Clean Catch    Lipase    Tachycardia        Relevant Orders    TSH Rfx On Abnormal To Free T4            SENT TO ER FOR FURTHER EVALUATION:  Elevated , no previous history of diabetes  Tachycardic- with  concern for infarct on EKG, pt denies chest pain.  Generalized Abdominal pain for the last 2 weeks.    Patient agreed best to go to ER to have additional evaluation  Will have come back in one week for follow up after discharge from hospital.       Follow Up   No follow-ups on file.  Patient was given instructions and counseling regarding her condition or for health maintenance advice. Please see specific information pulled into the AVS if appropriate.    EpicAct:[email or web address]_ORDERS,RunParams:STARTUPTYPE=FOLLOW    [email or web address]_DISCHARGE

## 2022-10-13 NOTE — ED NOTES
Pt arrived via wheelchair by nurse from the office. Pt states she went to the doctor today due to stomach issues and possible diabetes. States the office wanted her to come to the ER due to high heart rate and blood sugar. States it started about 3 weeks ago.    This RN wore N95 mask, gloves, eyewear and all other appropriate PPE while performing patient care.

## 2022-10-13 NOTE — ED PROVIDER NOTES
EMERGENCY DEPARTMENT ENCOUNTER    Room Number:  32/32  Date of encounter:  10/13/2022  PCP: Lisa Rascon MD  Historian: Patient  Full history not obtainable due to: None    HPI:  Chief Complaint: Fatigue    Context: Emilia Squires is a 67 y.o. female with a PMH significant for alcohol abuse, depression, hypertension, acquired coagulation factor deficiency who presents to the ED c/o fatigue.  The patient states for the past few weeks she has had increased fatigue and went to her PCP office today for further evaluation.  She was found to have an elevated blood sugar in the mid 300s and was found to have a heart rate in the 120s so she was sent here for further evaluation.  She admits to noticing some mild increase in urinary frequency but denies dysuria, hematuria.  No changes to bowel habits.  She does admit to some intermittent abdominal pain which is typically right lower quadrant.  Currently she feels only some mild discomfort to the abdomen.  Denies recent antibiotics or abdominal surgeries.      MEDICAL RECORD REVIEW:    Upon review of the medical record it appears the patient was evaluated earlier today in the office with her internal medicine provider for elevated blood glucose, hypertension, tachycardia      PAST MEDICAL HISTORY    Active Ambulatory Problems     Diagnosis Date Noted   • AA (alcohol abuse) 08/30/2022   • Benign essential HTN 08/30/2022   • Blues 08/30/2022   • Hyperlipidemia 04/19/2013     Resolved Ambulatory Problems     Diagnosis Date Noted   • No Resolved Ambulatory Problems     Past Medical History:   Diagnosis Date   • Acquired coagulation factor deficiency (HCC)    • Alcohol abuse    • Depression    • Diverticulitis    • Hypertension          PAST SURGICAL HISTORY  Past Surgical History:   Procedure Laterality Date   • ILEOSTOMY CLOSURE           FAMILY HISTORY  No family history on file.      SOCIAL HISTORY  Social History     Socioeconomic History   • Marital status:     Tobacco Use   • Smoking status: Never   • Smokeless tobacco: Never   Vaping Use   • Vaping Use: Never used   Substance and Sexual Activity   • Alcohol use: Yes     Alcohol/week: 2.0 - 3.0 standard drinks     Types: 2 - 3 Glasses of wine per week     Comment: daily wine   • Drug use: Never   • Sexual activity: Defer         ALLERGIES  Penicillins        REVIEW OF SYSTEMS    All systems reviewed and marked as negative except as listed in HPI     PHYSICAL EXAM    I have reviewed the triage vital signs and nursing notes.    ED Triage Vitals [10/13/22 1641]   Temp Heart Rate Resp BP SpO2   97.4 °F (36.3 °C) (!) 125 18 -- 98 %      Temp src Heart Rate Source Patient Position BP Location FiO2 (%)   Tympanic -- -- -- --       Physical Exam  Constitutional:       General: She is not in acute distress.     Appearance: She is well-developed.   HENT:      Head: Normocephalic and atraumatic.   Eyes:      General: No scleral icterus.     Conjunctiva/sclera: Conjunctivae normal.   Neck:      Trachea: No tracheal deviation.   Cardiovascular:      Rate and Rhythm: Regular rhythm. Tachycardia present.   Pulmonary:      Effort: Pulmonary effort is normal.      Breath sounds: Normal breath sounds.   Abdominal:      Palpations: Abdomen is soft.      Tenderness: There is abdominal tenderness in the right lower quadrant.   Musculoskeletal:         General: No deformity.      Cervical back: Normal range of motion.   Lymphadenopathy:      Cervical: No cervical adenopathy.   Skin:     General: Skin is warm and dry.   Neurological:      Mental Status: She is alert and oriented to person, place, and time.   Psychiatric:         Behavior: Behavior normal.         Vital signs and nursing notes reviewed.            LAB RESULTS  Recent Results (from the past 24 hour(s))   Comprehensive Metabolic Panel    Collection Time: 10/13/22  5:15 PM    Specimen: Blood   Result Value Ref Range    Glucose 490 (C) 65 - 99 mg/dL    BUN 18 8 - 23 mg/dL     Creatinine 0.98 0.57 - 1.00 mg/dL    Sodium 128 (L) 136 - 145 mmol/L    Potassium 4.5 3.5 - 5.2 mmol/L    Chloride 88 (L) 98 - 107 mmol/L    CO2 24.3 22.0 - 29.0 mmol/L    Calcium 9.2 8.6 - 10.5 mg/dL    Total Protein 7.3 6.0 - 8.5 g/dL    Albumin 4.30 3.50 - 5.20 g/dL    ALT (SGPT) 41 (H) 1 - 33 U/L    AST (SGOT) 35 (H) 1 - 32 U/L    Alkaline Phosphatase 91 39 - 117 U/L    Total Bilirubin 0.7 0.0 - 1.2 mg/dL    Globulin 3.0 gm/dL    A/G Ratio 1.4 g/dL    BUN/Creatinine Ratio 18.4 7.0 - 25.0    Anion Gap 15.7 (H) 5.0 - 15.0 mmol/L    eGFR 63.4 >60.0 mL/min/1.73   Green Top (Gel)    Collection Time: 10/13/22  5:15 PM   Result Value Ref Range    Extra Tube Hold for add-ons.    Lavender Top    Collection Time: 10/13/22  5:15 PM   Result Value Ref Range    Extra Tube hold for add-on    Gold Top - SST    Collection Time: 10/13/22  5:15 PM   Result Value Ref Range    Extra Tube Hold for add-ons.    Light Blue Top    Collection Time: 10/13/22  5:15 PM   Result Value Ref Range    Extra Tube Hold for add-ons.    CBC Auto Differential    Collection Time: 10/13/22  5:15 PM    Specimen: Blood   Result Value Ref Range    WBC 8.65 3.40 - 10.80 10*3/mm3    RBC 4.70 3.77 - 5.28 10*6/mm3    Hemoglobin 17.0 (H) 12.0 - 15.9 g/dL    Hematocrit 49.0 (H) 34.0 - 46.6 %    .3 (H) 79.0 - 97.0 fL    MCH 36.2 (H) 26.6 - 33.0 pg    MCHC 34.7 31.5 - 35.7 g/dL    RDW 12.1 (L) 12.3 - 15.4 %    RDW-SD 46.3 37.0 - 54.0 fl    MPV 9.8 6.0 - 12.0 fL    Platelets 318 140 - 450 10*3/mm3    Neutrophil % 71.3 42.7 - 76.0 %    Lymphocyte % 17.7 (L) 19.6 - 45.3 %    Monocyte % 9.0 5.0 - 12.0 %    Eosinophil % 0.9 0.3 - 6.2 %    Basophil % 0.5 0.0 - 1.5 %    Immature Grans % 0.6 (H) 0.0 - 0.5 %    Neutrophils, Absolute 6.17 1.70 - 7.00 10*3/mm3    Lymphocytes, Absolute 1.53 0.70 - 3.10 10*3/mm3    Monocytes, Absolute 0.78 0.10 - 0.90 10*3/mm3    Eosinophils, Absolute 0.08 0.00 - 0.40 10*3/mm3    Basophils, Absolute 0.04 0.00 - 0.20 10*3/mm3     Immature Grans, Absolute 0.05 0.00 - 0.05 10*3/mm3    nRBC 0.0 0.0 - 0.2 /100 WBC   Lipase    Collection Time: 10/13/22  5:15 PM    Specimen: Blood   Result Value Ref Range    Lipase 63 (H) 13 - 60 U/L   Troponin    Collection Time: 10/13/22  5:15 PM    Specimen: Blood   Result Value Ref Range    Troponin T <0.010 0.000 - 0.030 ng/mL   ECG 12 Lead    Collection Time: 10/13/22  6:01 PM   Result Value Ref Range    QT Interval 320 ms   COVID-19,[facility] CEPHE[facility]/MELINA NP SWAB IN TRANSPORT MEDIA 8-12 HR TAT - Swab, Nasopharynx    Collection Time: 10/13/22  6:09 PM    Specimen: Nasopharynx; Swab   Result Value Ref Range    COVID19 Not Detected Not Detected - Ref. Range   Blood Gas, Venous -    Collection Time: 10/13/22  7:47 PM    Specimen: Venous Blood   Result Value Ref Range    Site N/A     pH, Venous 7.328 7.310 - 7.410 pH Units    pCO2, Venous 47.2 41.0 - 51.0 mm Hg    pO2, Venous 16.6 (L) 35.0 - 45.0 mm Hg    HCO3, Venous 24.7 22.0 - 28.0 mmol/L    Base Excess, Venous -1.9 -2.0 - 2.0 mmol/L    O2 Saturation Calculated 20.2 (L) 92.0 - 99.0 %    Barometric Pressure for Blood Gas 745.1 mmHg    Modality Room Air     Rate 18 Breaths/minute       Ordered the above labs and independently reviewed the results.        RADIOLOGY  XR Chest 1 View    Result Date: 10/13/2022  XR CHEST 1 VW-  HISTORY:  Rapid heart rate, cough.  COMPARISON:  Chest radiograph 6/11/2020  FINDINGS:  A single view of the chest was obtained. The cardiac silhouette and mediastinal and hilar contours are not significantly changed. There is no focal consolidation or effusion. The visualized osseous structures are grossly similar.  This report was finalized on 10/13/2022 6:36 PM by Dr. Aurelia GUEVARA I ordered the above noted radiological studies. Independently reviewed by me and discussed with radiologist.  See dictation above for official radiology interpretation.      PROCEDURES    Procedures        MEDICATIONS GIVEN IN  ER    Medications   sodium chloride 0.9 % flush 10 mL (has no administration in time range)   insulin regular (humuLIN R,novoLIN R) injection 6 Units (has no administration in time range)   sodium chloride 0.9 % bolus 1,000 mL (1,000 mL Intravenous New Bag 10/13/22 1817)   iopamidol (ISOVUE-300) 61 % injection 100 mL (85 mL Intravenous Given 10/13/22 1859)         PROGRESS, DATA ANALYSIS, CONSULTS, AND MEDICAL DECISION MAKING    All labs have been independently reviewed by me.  All radiology studies have been reviewed by me.   EKG's independently reviewed by me.  Discussion below represents my analysis of pertinent findings related to patient's condition, differential diagnosis, treatment plan and final disposition.    DIFFERENTIAL DIAGNOSIS INCLUDE BUT NOT LIMITED TO:     Differential diagnosis includes but is not limited to:  - Hepatobiliary pathology such as cholecystitis, cholangitis, and symptomatic cholelithiasis  - Pancreatitis  - Dyspepsia  - Small bowel obstruction  - Appendicitis  - Diverticulitis  - UTI including pyelonephritis  - Ureteral stone  - Zoster  - Colitis, including infectious and ischemic  - Atypical ACS  - DKA  - New onset diabetes      ED Course as of 10/13/22 1958   Thu Oct 13, 2022   1810 Glucose(!!): 490 [DC]   1815 I viewed CXR on PACS system.  My interpretation is no pneumothorax. [DC]   1816 Sodium(!): 128 [DC]   1816 Chloride(!): 88 [DC]   1816 AST (SGOT)(!): 35 [DC]   1816 ALT (SGPT)(!): 41 [DC]   1816 Lipase(!): 63 [DC]   1948 I spoke with SARIAH Meadows with Obs Unit at this time regarding the patient.  I discussed work-up, results, concerns.  I discussed the consulting provider's desire for Obs Unit admit.     [DC]   1956 Preliminary result from radiologist reading the CT scan is that she has endometrial thickening and recommended nonemergent pelvic ultrasound to further evaluate. [DC]      ED Course User Index  [DC] Leoncio Ureña PA       AS OF 19:58 EDT VITALS:    BP -  155/98  HR - 107  TEMP - 97.4 °F (36.3 °C) (Tympanic)  02 SATS - 98%    1950 I rechecked the patient.  I discussed the patient's radiology findings (including all incidental findings), diagnosis, and plan for admission.  All questions answered.        DIAGNOSIS  Final diagnoses:   Diabetes mellitus, new onset (HCC)   Hyperglycemia   RLQ abdominal pain   Hyponatremia   Abnormal CT scan         DISPOSITION  Admit Obs    Pt masked in first look. I wore a surgical mask throughout my encounters with the pt. I performed hand hygiene on entry into the pt room and upon exit.     Dictated utilizing [employer] dictation     Note Disclaimer: At [facility], we believe that sharing information builds trust and better relationships. You are receiving this note because you recently visited [facility]. It is possible you will see health information before a provider has talked with you about it. This kind of information can be easy to misunderstand. To help you fully understand what it means for your health, we urge you to discuss this note with your provider.      Leoncio Ureña PA  10/13/22 1958

## 2022-10-13 NOTE — ED TRIAGE NOTES
Patient went to see Dr Skelton as a new patient today for concern of her home blood sugar running 140-200. Upon arrival to MD office she was tachycardic so they brought her here for eval    Mask placed on patient in triage. Triage staff wore appropriate PPE during interaction with patient.

## 2022-10-14 ENCOUNTER — READMISSION MANAGEMENT (OUTPATIENT)
Dept: CALL CENTER | Facility: HOSPITAL | Age: 67
End: 2022-10-14

## 2022-10-14 VITALS
BODY MASS INDEX: 28.12 KG/M2 | WEIGHT: 175 LBS | TEMPERATURE: 98 F | DIASTOLIC BLOOD PRESSURE: 80 MMHG | OXYGEN SATURATION: 99 % | RESPIRATION RATE: 16 BRPM | SYSTOLIC BLOOD PRESSURE: 147 MMHG | HEIGHT: 66 IN | HEART RATE: 95 BPM

## 2022-10-14 LAB
ALBUMIN SERPL-MCNC: 3 G/DL (ref 3.5–5.2)
ALBUMIN/GLOB SERPL: 1.4 G/DL
ALP SERPL-CCNC: 63 U/L (ref 39–117)
ALT SERPL W P-5'-P-CCNC: 28 U/L (ref 1–33)
ANION GAP SERPL CALCULATED.3IONS-SCNC: 10.5 MMOL/L (ref 5–15)
AST SERPL-CCNC: 22 U/L (ref 1–32)
BILIRUB SERPL-MCNC: 0.8 MG/DL (ref 0–1.2)
BUN SERPL-MCNC: 13 MG/DL (ref 8–23)
BUN/CREAT SERPL: 20.3 (ref 7–25)
CALCIUM SPEC-SCNC: 8.6 MG/DL (ref 8.6–10.5)
CHLORIDE SERPL-SCNC: 100 MMOL/L (ref 98–107)
CO2 SERPL-SCNC: 21.5 MMOL/L (ref 22–29)
CREAT SERPL-MCNC: 0.64 MG/DL (ref 0.57–1)
DEPRECATED RDW RBC AUTO: 46.3 FL (ref 37–54)
EGFRCR SERPLBLD CKD-EPI 2021: 97 ML/MIN/1.73
ERYTHROCYTE [DISTWIDTH] IN BLOOD BY AUTOMATED COUNT: 12.5 % (ref 12.3–15.4)
GLOBULIN UR ELPH-MCNC: 2.2 GM/DL
GLUCOSE BLDC GLUCOMTR-MCNC: 223 MG/DL (ref 70–130)
GLUCOSE BLDC GLUCOMTR-MCNC: 252 MG/DL (ref 70–130)
GLUCOSE BLDC GLUCOMTR-MCNC: 262 MG/DL (ref 70–130)
GLUCOSE BLDC GLUCOMTR-MCNC: 265 MG/DL (ref 70–130)
GLUCOSE BLDC GLUCOMTR-MCNC: 275 MG/DL (ref 70–130)
GLUCOSE SERPL-MCNC: 273 MG/DL (ref 65–99)
HCT VFR BLD AUTO: 38.5 % (ref 34–46.6)
HGB BLD-MCNC: 13.5 G/DL (ref 12–15.9)
MCH RBC QN AUTO: 36.1 PG (ref 26.6–33)
MCHC RBC AUTO-ENTMCNC: 35.1 G/DL (ref 31.5–35.7)
MCV RBC AUTO: 102.9 FL (ref 79–97)
PLATELET # BLD AUTO: 261 10*3/MM3 (ref 140–450)
PMV BLD AUTO: 10.5 FL (ref 6–12)
POTASSIUM SERPL-SCNC: 4.1 MMOL/L (ref 3.5–5.2)
PROT SERPL-MCNC: 5.2 G/DL (ref 6–8.5)
RBC # BLD AUTO: 3.74 10*6/MM3 (ref 3.77–5.28)
SODIUM SERPL-SCNC: 132 MMOL/L (ref 136–145)
WBC NRBC COR # BLD: 8.22 10*3/MM3 (ref 3.4–10.8)

## 2022-10-14 PROCEDURE — 82962 GLUCOSE BLOOD TEST: CPT

## 2022-10-14 PROCEDURE — 80053 COMPREHEN METABOLIC PANEL: CPT | Performed by: NURSE PRACTITIONER

## 2022-10-14 PROCEDURE — 63710000001 INSULIN LISPRO (HUMAN) PER 5 UNITS: Performed by: NURSE PRACTITIONER

## 2022-10-14 PROCEDURE — G0378 HOSPITAL OBSERVATION PER HR: HCPCS

## 2022-10-14 PROCEDURE — 85027 COMPLETE CBC AUTOMATED: CPT | Performed by: NURSE PRACTITIONER

## 2022-10-14 RX ORDER — INSULIN LISPRO 100 [IU]/ML
0-9 INJECTION, SOLUTION INTRAVENOUS; SUBCUTANEOUS
Status: DISCONTINUED | OUTPATIENT
Start: 2022-10-14 | End: 2022-10-14 | Stop reason: HOSPADM

## 2022-10-14 RX ORDER — NICOTINE POLACRILEX 4 MG
15 LOZENGE BUCCAL
Status: DISCONTINUED | OUTPATIENT
Start: 2022-10-14 | End: 2022-10-14 | Stop reason: HOSPADM

## 2022-10-14 RX ORDER — BLOOD-GLUCOSE METER
EACH MISCELLANEOUS
Qty: 1 KIT | Refills: 0 | Status: SHIPPED | OUTPATIENT
Start: 2022-10-14

## 2022-10-14 RX ORDER — DEXTROSE MONOHYDRATE 25 G/50ML
25 INJECTION, SOLUTION INTRAVENOUS
Status: DISCONTINUED | OUTPATIENT
Start: 2022-10-14 | End: 2022-10-14 | Stop reason: HOSPADM

## 2022-10-14 RX ORDER — SODIUM CHLORIDE, SODIUM LACTATE, POTASSIUM CHLORIDE, CALCIUM CHLORIDE 600; 310; 30; 20 MG/100ML; MG/100ML; MG/100ML; MG/100ML
125 INJECTION, SOLUTION INTRAVENOUS CONTINUOUS
Status: DISCONTINUED | OUTPATIENT
Start: 2022-10-14 | End: 2022-10-14 | Stop reason: HOSPADM

## 2022-10-14 RX ORDER — LANCETS 30 GAUGE
EACH MISCELLANEOUS
Qty: 100 EACH | Refills: 0 | Status: SHIPPED | OUTPATIENT
Start: 2022-10-14 | End: 2022-10-14 | Stop reason: SDUPTHER

## 2022-10-14 RX ORDER — LANCETS 30 GAUGE
EACH MISCELLANEOUS
Qty: 100 EACH | Refills: 0 | Status: SHIPPED | OUTPATIENT
Start: 2022-10-14

## 2022-10-14 RX ORDER — BLOOD-GLUCOSE METER
KIT MISCELLANEOUS
Qty: 1 EACH | Refills: 0 | Status: SHIPPED | OUTPATIENT
Start: 2022-10-14 | End: 2022-10-14 | Stop reason: SDUPTHER

## 2022-10-14 RX ORDER — GLIPIZIDE 5 MG/1
5 TABLET ORAL
Qty: 60 TABLET | Refills: 0 | Status: SHIPPED | OUTPATIENT
Start: 2022-10-14 | End: 2022-10-14 | Stop reason: SDUPTHER

## 2022-10-14 RX ORDER — GLIPIZIDE 5 MG/1
5 TABLET ORAL
Qty: 60 TABLET | Refills: 0 | Status: SHIPPED | OUTPATIENT
Start: 2022-10-14 | End: 2022-11-10 | Stop reason: SDUPTHER

## 2022-10-14 RX ADMIN — INSULIN LISPRO 4 UNITS: 100 INJECTION, SOLUTION INTRAVENOUS; SUBCUTANEOUS at 12:11

## 2022-10-14 RX ADMIN — SODIUM CHLORIDE, POTASSIUM CHLORIDE, SODIUM LACTATE AND CALCIUM CHLORIDE 125 ML/HR: 600; 310; 30; 20 INJECTION, SOLUTION INTRAVENOUS at 03:00

## 2022-10-14 RX ADMIN — INSULIN LISPRO 6 UNITS: 100 INJECTION, SOLUTION INTRAVENOUS; SUBCUTANEOUS at 08:37

## 2022-10-14 RX ADMIN — Medication 10 ML: at 08:40

## 2022-10-14 NOTE — OUTREACH NOTE
Prep Survey    Flowsheet Row Responses   [facility] patient discharged from? [facility]   Is LACE score < 7 ? Yes   Emergency Room discharge w/ pulse ox? No   Eligibility [facility]   Date of Admission 10/13/22   Date of Discharge 10/14/22   Discharge Disposition Home or Self Care   Discharge diagnosis Hyperglycemia   Does the patient have one of the following disease processes/diagnoses(primary or secondary)? Other   Does the patient have Home health ordered? No   Is there a DME ordered? No   Prep survey completed? Yes          TAMERA KUMARI - Registered Nurse

## 2022-10-14 NOTE — ED NOTES
Nursing report ED to floor  Emilia Squires  67 y.o.  female    HPI :   Chief Complaint   Patient presents with    Rapid Heart Rate       Admitting doctor:   Heath Adam MD    Admitting diagnosis:   The primary encounter diagnosis was Diabetes mellitus, new onset (HCC). Diagnoses of Hyperglycemia, RLQ abdominal pain, Hyponatremia, and Abnormal CT scan were also pertinent to this visit.    Code status:   Current Code Status       Date Active Code Status Order ID Comments User Context       10/13/2022 1959 CPR (Attempt to Resuscitate) [id number]  Prabhakar Grossman APRN ED        Question Answer    Code Status (Patient has no pulse and is not breathing) CPR (Attempt to Resuscitate)    Medical Interventions (Patient has pulse or is breathing) Full Support    Level Of Support Discussed With Patient                    Allergies:   Penicillins    Isolation:   Enhanced Droplet/Contact     Intake and Output  No intake or output data in the 24 hours ending 10/13/22 2013    Weight:       10/13/22  1805   Weight: 76.7 kg (169 lb)       Most recent vitals:   Vitals:    10/13/22 1801 10/13/22 1805 10/13/22 1831 10/13/22 1931   BP: 155/89  169/89 155/98   Pulse: 118  113 107   Resp:       Temp:       TempSrc:       SpO2: 99%  99% 98%   Weight:  76.7 kg (169 lb)         Active LDAs/IV Access:   Lines, Drains & Airways       Active LDAs       Name Placement date Placement time Site Days    Peripheral IV 10/13/22 1816 Right Wrist 10/13/22  1816  Wrist  less than 1                    Labs (abnormal labs have a star):   Labs Reviewed   COMPREHENSIVE METABOLIC PANEL - Abnormal; Notable for the following components:       Result Value    Glucose 490 (*)     Sodium 128 (*)     Chloride 88 (*)     ALT (SGPT) 41 (*)     AST (SGOT) 35 (*)     Anion Gap 15.7 (*)     All other components within normal limits    Narrative:     GFR Normal >60  Chronic Kidney Disease <60  Kidney Failure <15     CBC WITH AUTO DIFFERENTIAL - Abnormal;  Notable for the following components:    Hemoglobin 17.0 (*)     Hematocrit 49.0 (*)     .3 (*)     MCH 36.2 (*)     RDW 12.1 (*)     Lymphocyte % 17.7 (*)     Immature Grans % 0.6 (*)     All other components within normal limits   LIPASE - Abnormal; Notable for the following components:    Lipase 63 (*)     All other components within normal limits   BLOOD GAS, VENOUS - Abnormal; Notable for the following components:    pO2, Venous 16.6 (*)     O2 Saturation Calculated 20.2 (*)     All other components within normal limits   COVID-19,[facility]-HOUSE CEPHEID/MELINA, NP SWAB IN TRANSPORT MEDIA 8-12 HR TAT - Normal    Narrative:     Fact sheet for providers: https://www.fda.gov/media/821177/download     Fact sheet for patients: https://www.fda.gov/media/260064/download   TROPONIN (IN-HOUSE) - Normal    Narrative:     Troponin T Reference Range:  <= 0.03 ng/mL-   Negative for AMI  >0.03 ng/mL-     Abnormal for myocardial necrosis.  Clinicians would have to utilize clinical acumen, EKG, Troponin and serial changes to determine if it is an Acute Myocardial Infarction or myocardial injury due to an underlying chronic condition.       Results may be falsely decreased if patient taking Biotin.     RAINBOW DRAW    Narrative:     The following orders were created for panel order [place] Draw.  Procedure                               Abnormality         Status                     ---------                               -----------         ------                     Green Top (Gel)[[id number]]                                  Final result               Lavender Top[[id number]]                                     Final result               Gold Top - SST[[id number]]                                   Final result               Light Blue Top[[id number]]                                   Final result                 Please view results for these tests on the individual orders.   BLOOD GAS, VENOUS   URINALYSIS W/ CULTURE IF  INDICATED   POCT GLUCOSE FINGERSTICK   POCT GLUCOSE FINGERSTICK   POCT GLUCOSE FINGERSTICK   POCT GLUCOSE FINGERSTICK   POCT GLUCOSE FINGERSTICK   POCT GLUCOSE FINGERSTICK   POCT GLUCOSE FINGERSTICK   POCT GLUCOSE FINGERSTICK   POCT GLUCOSE FINGERSTICK   POCT GLUCOSE FINGERSTICK   POCT GLUCOSE FINGERSTICK   POCT GLUCOSE FINGERSTICK   POCT GLUCOSE FINGERSTICK   CBC AND DIFFERENTIAL    Narrative:     The following orders were created for panel order CBC & Differential.  Procedure                               Abnormality         Status                     ---------                               -----------         ------                     CBC Auto Differential[[id number]]        Abnormal            Final result                 Please view results for these tests on the individual orders.   GREEN TOP   LAVENDER TOP   GOLD TOP - SST   LIGHT BLUE TOP       EKG:   ECG 12 Lead   Preliminary Result   HEART RATE= 118  bpm   RR Interval= 508  ms   MS Interval= 168  ms   P Horizontal Axis= 19  deg   P Front Axis= 59  deg   QRSD Interval= 87  ms   QT Interval= 320  ms   QRS Axis= -8  deg   T Wave Axis= 63  deg   - OTHERWISE NORMAL ECG -   Sinus tachycardia   Electronically Signed By:    Date and Time of Study: 2022-10-13 18:01:19          Meds given in ED:   Medications   sodium chloride 0.9 % flush 10 mL (has no administration in time range)   insulin regular (humuLIN R,novoLIN R) injection 6 Units (has no administration in time range)   nitroglycerin (NITROSTAT) SL tablet 0.4 mg (has no administration in time range)   sodium chloride 0.9 % flush 10 mL (has no administration in time range)   sodium chloride 0.9 % flush 10 mL (has no administration in time range)   ondansetron (ZOFRAN) tablet 4 mg (has no administration in time range)     Or   ondansetron (ZOFRAN) injection 4 mg (has no administration in time range)   sodium chloride 0.9 % bolus 1,000 mL (1,000 mL Intravenous New Bag 10/13/22 1817)   iopamidol (ISOVUE-300) 61  % injection 100 mL (85 mL Intravenous Given 10/13/22 2999)       Imaging results:  CT Abdomen Pelvis With Contrast    Result Date: 10/13/2022   1.  Endometrial thickening and/or fluid within the endometrial cavity, greater than expected for patient age. Recommend further evaluation with pelvic ultrasound. 2.  Hepatic steatosis.  Discussed with ROSS Nam at 7:57 PM.  This report was finalized on 10/13/2022 7:57 PM by Dr. Aurelia Parr M.D.       Ambulatory status:   Stand by    Social issues:   Social History     Socioeconomic History    Marital status:    Tobacco Use    Smoking status: Never    Smokeless tobacco: Never   Vaping Use    Vaping Use: Never used   Substance and Sexual Activity    Alcohol use: Yes     Alcohol/week: 2.0 - 3.0 standard drinks     Types: 2 - 3 Glasses of wine per week     Comment: daily wine    Drug use: Never    Sexual activity: Defer       NIH Stroke Scale:         Dayanna Sanchez RN  10/13/22 20:13 EDT

## 2022-10-14 NOTE — NURSING NOTE
Note DM ed order for new dx. Meet with 68 y/o at bedside to assess needs for DM ed. Pt knows how to check BGs as she has been checking BGs at home w/her spouse's meter- who has DM. Pt is asking about getting a BG sensor/CGM. This RN communicated w/our pharmacist about pt's coverage for a sensor. Pt reports her spouse has a Addison. Pt's co-pay for Addison/supplies would be $75/mo. Pt does not appear amenable to pay this for a sensor. In the meantime, I have placed the order set for BG meter/supply rxs for dc. They need to be signed off by provider. Note to follow. Thank you.

## 2022-10-14 NOTE — NURSING NOTE
"Diabetes Education  Assessment/Teaching    Patient Name:  Emilia Squires  YOB: 1955  MRN: [id number]  Admit Date:  10/13/2022      Assessment Date:  10/14/2022  Flowsheet Row Most Recent Value   General Information     Referral From: MD petty. Meet with 66 y/o at bedside in obs unit to initiate DM ed. Pt reports her spouse has DM and has not attended classes. Encourage pt to ask PCP for referral to DM ed here for benefit of both pt and her spouse.    Height 167.6 cm (66\")   Height Method Stated   Weight 79.4 kg (175 lb)   Weight Method Bed scale   Diabetes History    What type of diabetes do you have? Type 2   Length of Diabetes Diagnosis Newly diagnosed this admission.    Current DM knowledge -- pt reports her spouse has (T2) DM.   Do you test your blood sugar at home? yes   Have you had high blood sugar? (>140mg/dl) yes -current a1c >10.   Education Preferences    Barriers to Learning -- somewhat low health literacy.   Nutrition Information    Assessment Topics    Healthy Eating - Assessment Needs education   Taking Medication - Assessment Needs education -anticipate pt to dc home later today w/oral DM tx per provider.   Problem Solving - Assessment Needs education   Healthy Coping - Assessment Competent   Monitoring - Assessment Competent   DM Goals    Monitoring - Goal 0-7 days from discharge   Contact Plan Follow-up medical care          Flowsheet Row Most Recent Value   DM Education Needs    Meter Needs own meter.   Meter Type -- DM educator initiated order set for BG meter/supply rxs for dc.   Medication Oral -advise pt of importance to take medication consistently/each day.   Problem Solving Hypoglycemia, Signs, Treatment   Healthy Eating -- D/w pt some basic concepts of healthy eating for DM. pt states she does not drink sodas.   Healthy Coping Appropriate   Discharge Plan Home, Follow-up with PCP   Motivation Engaged   Teaching Method Explanation, Discussion, Handouts   Patient Response " Needs reinforcement, Verbalized understanding      Other Comments:   Electronically signed by:  Odalis Funez RN, BSN, [facility]  10/14/22 13:49 EDT

## 2022-10-14 NOTE — PROGRESS NOTES
ED OBSERVATION PROGRESS/DISCHARGE SUMMARY    Date of Admission: 10/13/2022   LOS: 0 days   PCP: Lisa Rascon MD    Final Diagnosis new onset type 2 diabetes      Subjective     Hospital Outcome:   Patient is a pleasant afebrile ambulatory 67-year-old  female admitted to the observation unit for new onset type 2 diabetes.  She presented with some abdominal discomfort in the ER yesterday, this morning this has resolved.  Her blood sugar last night was in the 400s, this morning is improved to the 200s.  Her CT abdomen pelvis in the emergency department showed some uterine abnormality, her pelvic ultrasound this morning shows some mild endometrial thickening.  Patient reports her mom has a history of endometrial cancer.  I discussed this at length with her, plan to follow-up with her OB/GYN as an outpatient for further evaluation and testing as needed.  Patient is agreeable to plan.    She was seen and evaluated by Rocío robbins diabetic educator who advises that patient has tolerated glipizide well previously and would like to be back on that. She will assist patient in obtaining a glucometer. I have spoken with pharmacist who recommended Ozempic as this is covered well by her insurance, patient states she would prefer to start glipizide only and follow-up with her family doctor to see if she needs to be on Ozempic.    ROS:  General: no fevers, chills  Respiratory: no cough, dyspnea  Cardiovascular: no chest pain, palpitations  Abdomen: No abdominal pain, nausea, vomiting, or diarrhea  Neurologic: No focal weakness    Objective   Physical Exam:  I have reviewed the vital signs.  Temp:  [97.4 °F (36.3 °C)-98 °F (36.7 °C)] 98 °F (36.7 °C)  Heart Rate:  [] 93  Resp:  [16-22] 16  BP: (142-179)/(80-98) 160/88  General Appearance:    Alert, cooperative, no distress  Head:    Normocephalic, atraumatic  Eyes:    Sclerae anicteric  Neck:   Supple, no mass  Lungs: Clear to auscultation bilaterally,  respirations unlabored  Heart: Regular rate and rhythm, S1 and S2 normal, no murmur, rub or gallop  Abdomen:  Soft, non-tender, no guarding or rebound bowel sounds active, nondistended  Extremities: No clubbing, cyanosis, or edema to lower extremities  Pulses:  2+ and symmetric in distal lower extremities  Skin: No rashes   Neurologic: Oriented x3, Normal strength to extremities    Results Review:    I have reviewed the labs, radiology results and diagnostic studies.    Results from last 7 days   Lab Units 10/14/22  0509   WBC 10*3/mm3 8.22   HEMOGLOBIN g/dL 13.5   HEMATOCRIT % 38.5   PLATELETS 10*3/mm3 261     Results from last 7 days   Lab Units 10/14/22  0509 10/13/22  1715   SODIUM mmol/L 132* 128*   POTASSIUM mmol/L 4.1 4.5   CHLORIDE mmol/L 100 88*   CO2 mmol/L 21.5* 24.3   BUN mg/dL 13 18   CREATININE mg/dL 0.64 0.98   CALCIUM mg/dL 8.6 9.2   BILIRUBIN mg/dL 0.8 0.7   ALK PHOS U/L 63 91   ALT (SGPT) U/L 28 41*   AST (SGOT) U/L 22 35*   GLUCOSE mg/dL 273* 490*     Imaging Results (Last 24 Hours)     Procedure Component Value Units Date/Time    US Pelvis Complete [[id number]] Collected: 10/14/22 0001     Updated: 10/14/22 0001    Narrative:        Patient: WILLIE PATRICIA  Time Out: 00:00  Exam(s): US PELVIS     EXAM:    US Pelvis Transabdominal, Complete    CLINICAL HISTORY:     Reason for exam: Endometrial thickening and or fluid within the   endometrial cavity.    TECHNIQUE:    Real-time complete transabdominal pelvic ultrasound with image   documentation.    COMPARISON:    Abdomen and pelvis CT with IV contrast, 10 13 2022 at 1855 hrs.    FINDINGS:    Uterus cervix:  Uterus measures 7.2 x 4.0 x 4.2 cm.  The endometrium   measures 7 mm with trace fluid in the endometrial cavity.  No myometrial   mass.    Right ovary:  The ovaries are not well seen due to patient age and   bowel gas artifact.    Left ovary:  See above.    Free fluid:  No pelvic free fluid or adnexal mass.    Bladder:  Unremarkable as  visualized.  Wall is normal thickness for   degree of distention.    IMPRESSION:         Mild endometrial thickening to 7 mm with a trace amount of fluid in the   endometrial canal.      Impression:          Electronically signed by Elsa Arnett MD on 10-14-22 at 0000    CT Abdomen Pelvis With Contrast [[id number]] Collected: 10/13/22 1952     Updated: 10/13/22 2000    Narrative:      CT ABDOMEN PELVIS W CONTRAST-     HISTORY:  Right lower quadrant pain.      TECHNIQUE:  CT of the abdomen and pelvis was performed following the  administration of intravenous contrast.  Radiation dose reduction  techniques were utilized, including automated exposure control and  exposure modulation based on body size.     COMPARISON:  CT abdomen and pelvis 3/20/2013     FINDINGS:     There is lipomatous hypertrophy of the interatrial septum. There is no  pericardial effusion. There is mild bibasilar atelectasis and/or  scarring. Pleural spaces are clear.  The liver is normal in size. There is diffuse hepatic steatosis with  areas of focal fatty sparing. There is cholelithiasis and/or biliary  sludge. The spleen is normal in size. The pancreas is atrophic. The  adrenal glands are within normal limits. The kidneys are within normal  limits.  No pathologically enlarged abdominal or pelvic lymph nodes are  identified. There is moderate calcific iliac atherosclerosis.   There is a small hiatal hernia. There is no bowel obstruction. There are  postprocedural changes from partial colonic resection. There is  scattered colonic diverticula. The appendix is not clearly visualized,  however, there is no significant fat stranding adjacent to the cecum to  suggest acute appendicitis. The bladder is mildly distended and within  normal limits. The uterus is present. There is questioned endometrial  thickening versus fluid within the endometrial cavity, greater than  expected for the patient's age. There is no adnexal mass. There is a  small  fat and small bowel containing umbilical hernia.  There is multilevel degenerative disc disease. There is thoracolumbar  levoscoliosis.          Impression:         1.  Endometrial thickening and/or fluid within the endometrial cavity,  greater than expected for patient age. Recommend further evaluation with  pelvic ultrasound.  2.  Hepatic steatosis.     Discussed with ROSS Nam at 7:57 PM.     This report was finalized on 10/13/2022 7:57 PM by Dr. Aurelia Parr M.D.       XR Chest 1 View [[id number]] Collected: 10/13/22 1835     Updated: 10/13/22 1840    Narrative:      XR CHEST 1 VW-     HISTORY:  Rapid heart rate, cough.     COMPARISON:  Chest radiograph 6/11/2020     FINDINGS:    A single view of the chest was obtained. The cardiac silhouette and  mediastinal and hilar contours are not significantly changed. There is  no focal consolidation or effusion. The visualized osseous structures  are grossly similar.      This report was finalized on 10/13/2022 6:36 PM by Dr. Aurelia Parr M.D.             I have reviewed the medications.  ---------------------------------------------------------------------------------------------  Assessment & Plan   Assessment/Problem List    Hyperglycemia      Plan:  DM2, new onset  Hyperglycemia  Tachycardia  -DC home on glipizide  -A1c 10.2  -TSH 2.5     RLQ abdominal pain  -Resolved  -CT abdomen demonstrate endometrial thickening and/or fluid within the endometrial cavity recommend pelvic ultrasound for further evaluation  -Pelvic ultrasound shows mild endometrial thickening with trace fluid in the canal, outpt ob-gyn follow up recommended    Disposition: Home    Follow-up after Discharge: PCP, ob-gyn and diabetic educator    This note will serve as a discharge summary.     SARIAH Mann 10/14/22 09:11 EDT      I have worn appropriate PPE during this patient encounter, sanitized my hands both with entering and exiting patient's room.

## 2022-10-14 NOTE — H&P
.   [facility]   HISTORY AND PHYSICAL    Patient Name: Emilia Squires  : 1955  MRN: [id number]  Primary Care Physician:  Lisa Rascon MD  Date of admission: 10/13/2022    Subjective   Subjective     Chief Complaint: Weakness, hypoglycemia, and tachycardia    HPI:    Emilia Squires is a 67 y.o. female with past medical history including but limited to depression, hypertension, and alcohol abuse presents to [facility] with generalized weakness and hyperglycemia.  Patient reports she was sent to the ER from her PCPs office for further evaluation of her generalized weakness, tachycardia, and hyperglycemia.  Patient reports morning weakness and dizziness for the past 2 weeks.  Today, while at her doctor office she was found to be hypoglycemic with a sugar level in the 300s and heart rate in the 120s.  Patient reports increased urinary frequency but denies dysuria or hematuria.  Denies polyphagia or polydipsia.  Patient reports family history of diabetes and hypothyroidism.  Patient reports intermittent right lower abdominal pain for the past few days.  Denies nausea, vomit, or diarrhea.  Denies hematochezia or melena.  Denies chest pain, palpitation, dyspnea.  Denies cough, chills, fever, or lower extremity edema.    Laboratory evaluation at the ED included negative troponin, glucose 490, sodium 128, chloride 88, anion gap 15.7, ALT 41, AST 35, WBC 8.6, hemoglobin 17, and platelets 318 and lipase 63.  Urinalysis cloudy appearance, glucose> thousand, and trace of ketones.  COVID-19 negative.  Chest x-ray negative for acute findings.  CT abdomen pelvis with contrast demonstratesEndometrial thickening and/or fluid within the endometrial cavity,greater than expected for patient age.  EKG shows sinus tach with a rate of 118.    Review of Systems   All systems were reviewed and negative except for: All systems are reviewed and negative except for the mentioned above in HPI    Personal  History     Past Medical History:   Diagnosis Date   • Acquired coagulation factor deficiency (HCC)    • Alcohol abuse    • Depression    • Diverticulitis    • Hypertension        Past Surgical History:   Procedure Laterality Date   • ILEOSTOMY CLOSURE         Family History: family history is not on file. Otherwise pertinent FHx was reviewed and not pertinent to current issue.    Social History:  reports that she has never smoked. She has never used smokeless tobacco. She reports current alcohol use of about 2.0 - 3.0 standard drinks per week. She reports that she does not use drugs.    Home Medications:  Triprolidine-Pseudoephedrine, multivitamin, and vitamin D3    Allergies:  Allergies   Allergen Reactions   • Penicillins Swelling     THROAT SWELLS CLOSED       Objective   Objective     Vitals:   Temp:  [97.4 °F (36.3 °C)] 97.4 °F (36.3 °C)  Heart Rate:  [107-134] 107  Resp:  [18] 18  BP: (142-169)/(80-98) 155/98  Physical Exam    Constitutional: Awake, alert, no acute distress   Eyes: PERRLA, sclerae anicteric, no conjunctival injection   HENT: NCAT, mucous membranes moist   Neck: Supple, no thyromegaly, no lymphadenopathy, trachea midline   Respiratory: Clear to auscultation bilaterally, nonlabored respirations, no wheezing or stridor   Cardiovascular: RRR, no murmurs, rubs, or gallops, palpable pedal pulses bilaterally   Gastrointestinal: Positive bowel sounds, soft, nontender, nondistended   Musculoskeletal: No bilateral ankle edema, no clubbing or cyanosis to extremities   Psychiatric: Appropriate affect, cooperative   Neurologic: Oriented x 3, strength symmetric in all extremities, Cranial Nerves grossly intact to confrontation, speech clear   Skin: No rashes     Result Review    Result Review:  I have personally reviewed the results from the time of this admission to 10/13/2022 20:22 EDT and agree with these findings:  []  Laboratory list / accordion  []  Microbiology  []  Radiology  []  EKG/Telemetry   []   Cardiology/Vascular   []  Pathology  []  Old records  []  Other:  Most notable findings include:       Assessment & Plan   Assessment / Plan     Brief Patient Summary:  Emilia Squires is a 67 y.o. female who was seen and evaluated the ED for hyperglycemia and tachycardia.  Patient is being admitted to observation for further evaluation and management.    Active Hospital Problems:  Active Hospital Problems    Diagnosis    • **Hyperglycemia      Plan:   DM2, new onset  Hyperglycemia  Tachycardia  -Initial glucose 490 and patient has received 6 units of regular insulin  -Accu-Chek before meals and at bedtime  -Insulin sliding scale  -A1c pending  -IV hydration  -Cardiac monitoring  -TSH pending    RLQ abdominal pain  -CT abdomen demonstrate endometrial thickening and/or fluid within the endometrial cavity recommend pelvic ultrasound for further evaluation  -Pelvic ultrasound pending    DVT prophylaxis:  Mechanical DVT prophylaxis orders are present.    CODE STATUS:    Level Of Support Discussed With: Patient  Code Status (Patient has no pulse and is not breathing): CPR (Attempt to Resuscitate)  Medical Interventions (Patient has pulse or is breathing): Full Support    Admission Status:  I believe this patient meets observation status.    .I wore an face mask, eye protection, and gloves during this patient encounter. Patient also wearing a surgical mask. Hand hygeine performed before and after seeing the patient.    Electronically signed by SARIAH Hardy, 10/13/22, 8:22 PM EDT.

## 2022-10-14 NOTE — PROGRESS NOTES
MD ATTESTATION NOTE    The ALIREZA and I have discussed this patient's history, physical exam, and treatment plan.  I have reviewed the documentation and personally had a face to face interaction with the patient. I affirm the documentation and agree with the treatment and plan.  The attached note describes my personal findings.      I provided a substantive portion of the care of the patient.  I personally performed the physical exam in its entirety, and below are my findings.  For this patient encounter, the patient wore surgical mask, I wore full protective PPE including N95 and eye protection.      Brief HPI: Patient presents with 3 months of generalized fatigue.  She was having some lower abdominal pain yesterday.  She saw her PCP and was sent to the emergency department.  There she had a CT scan that showed endometrial thickening.  Pelvic ultrasound confirms this.  She was diagnosed with new onset diabetes.  She states that she has suspect that she is diabetic as she has been occasionally taking her 's glimepiride after checking her blood sugars at home.    PHYSICAL EXAM  ED Triage Vitals   Temp Heart Rate Resp BP SpO2   10/13/22 1641 10/13/22 1641 10/13/22 1641 10/13/22 1801 10/13/22 1641   97.4 °F (36.3 °C) (!) 125 18 155/89 98 %      Temp src Heart Rate Source Patient Position BP Location FiO2 (%)   10/13/22 1641 10/13/22 2210 10/13/22 2210 10/13/22 2210 --   Tympanic Monitor Lying Left arm          GENERAL: no acute distress  HENT: nares patent  EYES: no scleral icterus  CV: regular rhythm, normal rate at the time of my exam  RESPIRATORY: normal effort, clear to auscultation bilaterally  ABDOMEN: soft, nontender  MUSCULOSKELETAL: no deformity  NEURO: alert, moves all extremities, follows commands  PSYCH:  calm, cooperative  SKIN: warm, dry    Vital signs and nursing notes reviewed.        Plan:   - inpatient diabetes consult pending

## 2022-10-14 NOTE — CASE MANAGEMENT/SOCIAL WORK
Discharge Planning Assessment  [facility]     Patient Name: Emilia Squires  MRN: [id number]  Today's Date: 10/13/2022    Admit Date: 10/13/2022    Plan: Pt intends to return home upon discharge   Discharge Needs Assessment     Row Name 10/13/22 2052       Living Environment    People in Home spouse    Name(s) of People in Home Matt Squires [phone]    Current Living Arrangements home    Primary Care Provided by self    Provides Primary Care For no one    Family Caregiver if Needed spouse    Family Caregiver Names Matt Squires    Quality of Family Relationships helpful;involved;supportive    Able to Return to Prior Arrangements yes       Resource/Environmental Concerns    Resource/Environmental Concerns none    Transportation Concerns none       Transition Planning    Patient/Family Anticipates Transition to home;home with family    Patient/Family Anticipated Services at Transition none    Transportation Anticipated car, drives self;family or friend will provide       Discharge Needs Assessment    Readmission Within the Last 30 Days no previous admission in last 30 days    Equipment Currently Used at Home none    Concerns to be Addressed no discharge needs identified;denies needs/concerns at this time    Anticipated Changes Related to Illness none    Equipment Needed After Discharge none    Provided Post Acute Provider List? N/A    Provided Post Acute Provider Quality & Resource List? N/A               Discharge Plan     Row Name 10/13/22 2053       Plan    Plan Pt intends to return home upon discharge    Patient/Family in Agreement with Plan yes    Provided Post Acute Provider List? N/A    Provided Post Acute Provider Quality & Resource List? N/A    Plan Comments Face sheet verified, role of CCP explained. Pt is independent in ADL's and denies the need for any assistive devices. Pt denies any difficulty paying for medications. Advised pt to contact case management if any further needs arise.               Continued Care and Services - Admitted Since 10/13/2022    Coordination has not been started for this encounter.          Demographic Summary    No documentation.                Functional Status    No documentation.                Psychosocial    No documentation.                Abuse/Neglect    No documentation.                Legal    No documentation.                Substance Abuse    No documentation.                Patient Forms    No documentation.                   Annette Ann RN

## 2022-10-14 NOTE — PLAN OF CARE
Goal Outcome Evaluation:    Patient is no longer c/o of abdominal, glucose still elevated but has gradually improved since receiving insulin.

## 2022-10-17 ENCOUNTER — TRANSITIONAL CARE MANAGEMENT TELEPHONE ENCOUNTER (OUTPATIENT)
Dept: CALL CENTER | Facility: HOSPITAL | Age: 67
End: 2022-10-17

## 2022-10-17 NOTE — OUTREACH NOTE
Call Center TCM Note    Flowsheet Row Responses   [facility] patient discharged from? [facility]   Does the patient have one of the following disease processes/diagnoses(primary or secondary)? Other   TCM attempt successful? Yes  [No VR on file]   Call start time 1040   Call end time 1050   Discharge diagnosis Hyperglycemia   Is patient permission given to speak with other caregiver? Yes   List who call center can speak with    Meds reviewed with patient/caregiver? Yes   Is the patient having any side effects they believe may be caused by any medication additions or changes? No   Does the patient have all medications ordered at discharge? Yes   Is the patient taking all medications as directed (includes completed medication regime)? Yes   Comments hosp f/u appt 10-25-22@3pm arranged.   Psychosocial issues? No   Comments Monitoring glucose at home, 147. Pt felt shaky at work, suggested taking the meter to work with her to check when having symptoms. No issues with voiding, eating/drinking/   Did the patient receive a copy of their discharge instructions? Yes   Nursing interventions Reviewed instructions with patient   What is the patient's perception of their health status since discharge? Improving   Is the patient/caregiver able to teach back signs and symptoms related to disease process for when to call PCP? Yes   Is the patient/caregiver able to teach back signs and symptoms related to disease process for when to call 911? Yes   Is the patient/caregiver able to teach back the hierarchy of who to call/visit for symptoms/problems? PCP, Specialist, Home health nurse, Urgent Care, ED, 911 Yes   TCM call completed? Yes          Miladys Anderson RN    10/17/2022, 10:51 EDT

## 2022-10-25 ENCOUNTER — TELEPHONE (OUTPATIENT)
Dept: INTERNAL MEDICINE | Facility: CLINIC | Age: 67
End: 2022-10-25

## 2022-10-25 NOTE — TELEPHONE ENCOUNTER
[facility] staff attempted to follow warm transfer process and was unsuccessful     Caller: Emilia Squires    Relationship to patient: Self    Best call back number: [phone]    Patient is needing: PLEASE CALL PATIENT BACK TO RESCHEDULE HOSPITAL FOLLOW UP VISIT, SHE CAN NOT MAKE IT TO TODAY'S APPT. APPOINTMENT IS OUTSIDE OF [facility] WEEK WINDOW.

## 2022-10-25 NOTE — TELEPHONE ENCOUNTER
Caller: Emilia Squires    Relationship to patient: Self    Best call back number: [phone]    Chief complaint: HOSPITAL FOLLOW UP    Type of visit: HOSPITAL FOLLOW UP    Requested date:PATIENT STATED NEEDS SOMETHING AROUND 3-3:30 PM    If rescheduling, when is the original appointment: OCT 26    Additional notes:PATIENT RETURNING CALL.  STATED HAVING TROUBLE WITH HER CELL PHONE AND TO PLEASE REACH OUT VIA THE HOME PHONE NUMBER ABOVE.

## 2022-10-25 NOTE — TELEPHONE ENCOUNTER
[facility] staff attempted to follow warm transfer process and was unsuccessful     Caller: Emilia Squires    Relationship to patient: Self    Best call back number:[phone]    Patient is needing: PATIENT MISSED PHONE CALL FROM THE OFFICE.PATIENT WAS TRYING TO RESCHEDULE HOSPITAL FOLLOW UP AS SHE WASN'T AVAILABLE TO COME 10/26/22 AT 1:45.PATIENT REQUESTING IF SHE COULD GET IT AROUND 3:00. [facility] WAS UNABLE TO SCHEDULE AS IT IS OUTSIDE OF 7 DAY DISCHARGE WINDOW ALLOWED TO SCHEDULE.

## 2022-10-26 ENCOUNTER — OFFICE VISIT (OUTPATIENT)
Dept: INTERNAL MEDICINE | Facility: CLINIC | Age: 67
End: 2022-10-26

## 2022-10-26 VITALS
OXYGEN SATURATION: 99 % | DIASTOLIC BLOOD PRESSURE: 76 MMHG | HEIGHT: 66 IN | SYSTOLIC BLOOD PRESSURE: 140 MMHG | TEMPERATURE: 97.7 F | BODY MASS INDEX: 27.35 KG/M2 | WEIGHT: 170.2 LBS | HEART RATE: 112 BPM

## 2022-10-26 DIAGNOSIS — I10 BENIGN ESSENTIAL HTN: Primary | ICD-10-CM

## 2022-10-26 DIAGNOSIS — E11.65 TYPE 2 DIABETES MELLITUS WITH HYPERGLYCEMIA, WITHOUT LONG-TERM CURRENT USE OF INSULIN: ICD-10-CM

## 2022-10-26 DIAGNOSIS — R93.89 ENDOMETRIAL THICKENING ON ULTRASOUND: ICD-10-CM

## 2022-10-26 DIAGNOSIS — I10 BENIGN ESSENTIAL HTN: ICD-10-CM

## 2022-10-26 PROCEDURE — 99495 TRANSJ CARE MGMT MOD F2F 14D: CPT | Performed by: NURSE PRACTITIONER

## 2022-10-26 RX ORDER — METFORMIN HYDROCHLORIDE 750 MG/1
750 TABLET, EXTENDED RELEASE ORAL
Qty: 30 TABLET | Refills: 0 | Status: SHIPPED | OUTPATIENT
Start: 2022-10-26 | End: 2022-11-21

## 2022-10-26 RX ORDER — LOSARTAN POTASSIUM 25 MG/1
25 TABLET ORAL DAILY
Qty: 90 TABLET | OUTPATIENT
Start: 2022-10-26

## 2022-10-26 RX ORDER — LOSARTAN POTASSIUM 25 MG/1
25 TABLET ORAL DAILY
Qty: 30 TABLET | Refills: 0 | Status: SHIPPED | OUTPATIENT
Start: 2022-10-26 | End: 2022-12-01

## 2022-10-26 NOTE — PROGRESS NOTES
Transitional Care Follow Up Visit  Subjective     Emilia Squires is a 67 y.o. female who presents for a transitional care management visit.    Within 48 business hours after discharge our office contacted her via telephone to coordinate her care and needs.      I reviewed and discussed the details of that call along with the discharge summary, hospital problems, inpatient lab results, inpatient diagnostic studies, and consultation reports with Emilia.     Current outpatient and discharge medications have been reconciled for the patient.  Reviewed by: JAQUELINE Rivera III      Date of TCM Phone Call 10/14/2022   [facility]   Date of Admission 10/13/2022   Date of Discharge 10/14/2022   Discharge Disposition Home or Self Care     Risk for Readmission (LACE) Score: 2 (10/14/2022  6:00 AM)      History of Present Illness   Course During Hospital Stay:      Patient had come to office to establish with Dr Rascon.  3 months fatigue.    Glucose in office 357: sent to ER.    A1C 10.2%   Previously on glipizide - she was taking her 's prior to her appt with Dr Rascon as she new her glucose was up.   Ozempic was suggested but she declined.     Admitted for new onset Diabetes:   Seen by diabetes educator in hospital briefly.  Agrees to see again as she still needs work on diet.     She states glucose down to around 200 now.     Abd CT / US: showed endometrial thickening.  Fm hx endometrial cancer. She is to follow up with GYN. She states she is making appointment with GYN.   Also: hepatic steatosis     Works as [employer] at [employer].      Current outpatient and discharge medications have been reconciled for the patient.  Reviewed by: JAQUELINE Rivera III    The following portions of the patient's history were reviewed and updated as appropriate: allergies, current medications, past family history, past medical history, past social history, past surgical  history and problem list.    Review of Systems   Respiratory: Negative for shortness of breath.    Cardiovascular: Negative for chest pain.       Objective   Physical Exam  Vitals reviewed.   Constitutional:       Appearance: Normal appearance. She is well-developed.   HENT:      Head:      Comments: Wearing mask due to COVID   Neck:      Thyroid: No thyromegaly.   Cardiovascular:      Rate and Rhythm: Normal rate and regular rhythm.      Pulses: Normal pulses.      Heart sounds: Normal heart sounds.   Pulmonary:      Effort: Pulmonary effort is normal.      Breath sounds: Normal breath sounds.      Comments: E/U   Musculoskeletal:         General: Normal range of motion.      Cervical back: Normal range of motion and neck supple.   Lymphadenopathy:      Cervical: No cervical adenopathy.   Skin:     General: Skin is warm and dry.      Capillary Refill: Capillary refill takes 2 to 3 seconds.   Neurological:      Mental Status: She is alert and oriented to person, place, and time.   Psychiatric:         Mood and Affect: Mood normal.         Behavior: Behavior normal. Behavior is cooperative.         Thought Content: Thought content normal.         Judgment: Judgment normal.       Lab Results   Component Value Date    WBC 8.22 10/14/2022    HGB 13.5 10/14/2022    HCT 38.5 10/14/2022     10/14/2022    ALT 28 10/14/2022     Lab Results   Component Value Date    HGBA1C 10.20 (H) 10/13/2022     Lab Results   Component Value Date    TSH 2.560 10/13/2022     Lab Results   Component Value Date    GLUCOSE 273 (H) 10/14/2022    CALCIUM 8.6 10/14/2022     (L) 10/14/2022    K 4.1 10/14/2022    CO2 21.5 (L) 10/14/2022     10/14/2022    BUN 13 10/14/2022    CREATININE 0.64 10/14/2022    EGFRIFNONA 84 06/11/2020    BCR 20.3 10/14/2022    ANIONGAP 10.5 10/14/2022       Assessment & Plan   Problems Addressed this Visit        Cardiac and Vasculature    Benign essential HTN - Primary     Hypertension is  worsening.  Dietary sodium restriction.  Weight loss.  Regular aerobic exercise.  Medication changes per orders.  Blood pressure will be reassessed at the next regular appointment.    Start losartan 25 mg daily today.     I discussed medication use, dosing and possible side effects.   Patient was given opportunity to ask any questions.     Bring BP readings to next appointment for Dr Rascon to review.          Relevant Medications    losartan (Cozaar) 25 MG tablet       Endocrine and Metabolic    Type 2 diabetes mellitus with hyperglycemia, without long-term current use of insulin (HCC)     Diabetes is newly identified.     Will add metformin.  She is reluctant due to hearing about side effects.   I discussed medication use, dosing and possible side effects.   Patient was given opportunity to ask any questions.     We discussed uncontrolled diabetes complications with both small and large vessel risk, risk for CAD, MI and stroke. Goal is to prevent complications with diabetes control.     Will refer to diabetes education class.     Bring glucose readings to next appointment.     She will need a statin added at next appointment.    Atherosclerosis on CT - defer at this visit as starting hypertension medication and adding metformin.           Relevant Medications    metFORMIN ER (GLUCOPHAGE-XR) 750 MG 24 hr tablet    Other Relevant Orders    Ambulatory Referral to Diabetic Education   Other Visit Diagnoses     Endometrial thickening on ultrasound        She will fu with GYN       Diagnoses       Codes Comments    Benign essential HTN    -  Primary ICD-10-CM: I10  ICD-9-CM: 401.1     Type 2 diabetes mellitus with hyperglycemia, without long-term current use of insulin (HCC)     ICD-10-CM: E11.65  ICD-9-CM: 250.00, 790.29     Endometrial thickening on ultrasound     ICD-10-CM: R93.89  ICD-9-CM: 793.5 She will fu with GYN         Work excuse given today.     Follow up 2-3 weeks with PCP.

## 2022-10-26 NOTE — ASSESSMENT & PLAN NOTE
Hypertension is worsening.  Dietary sodium restriction.  Weight loss.  Regular aerobic exercise.  Medication changes per orders.  Blood pressure will be reassessed at the next regular appointment.    Start losartan 25 mg daily today.     I discussed medication use, dosing and possible side effects.   Patient was given opportunity to ask any questions.     Bring BP readings to next appointment for Dr Rascon to review.

## 2022-10-26 NOTE — ASSESSMENT & PLAN NOTE
Diabetes is newly identified.     Will add metformin.  She is reluctant due to hearing about side effects.   I discussed medication use, dosing and possible side effects.   Patient was given opportunity to ask any questions.     We discussed uncontrolled diabetes complications with both small and large vessel risk, risk for CAD, MI and stroke. Goal is to prevent complications with diabetes control.     Will refer to diabetes education class.     Bring glucose readings to next appointment.     She will need a statin added at next appointment.    Atherosclerosis on CT - defer at this visit as starting hypertension medication and adding metformin.

## 2022-11-11 RX ORDER — GLIPIZIDE 5 MG/1
5 TABLET ORAL
Qty: 60 TABLET | Refills: 0 | Status: SHIPPED | OUTPATIENT
Start: 2022-11-11 | End: 2022-12-13

## 2022-11-21 ENCOUNTER — OFFICE VISIT (OUTPATIENT)
Dept: INTERNAL MEDICINE | Facility: CLINIC | Age: 67
End: 2022-11-21

## 2022-11-21 VITALS
WEIGHT: 172 LBS | BODY MASS INDEX: 27.64 KG/M2 | OXYGEN SATURATION: 99 % | HEIGHT: 66 IN | TEMPERATURE: 97.5 F | DIASTOLIC BLOOD PRESSURE: 62 MMHG | HEART RATE: 118 BPM | SYSTOLIC BLOOD PRESSURE: 116 MMHG

## 2022-11-21 DIAGNOSIS — I10 BENIGN ESSENTIAL HTN: ICD-10-CM

## 2022-11-21 DIAGNOSIS — E11.65 TYPE 2 DIABETES MELLITUS WITH HYPERGLYCEMIA, WITHOUT LONG-TERM CURRENT USE OF INSULIN: Primary | ICD-10-CM
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
## 2022-11-21 PROCEDURE — 99214 OFFICE O/P EST MOD 30 MIN: CPT | Performed by: FAMILY MEDICINE

## 2022-11-21 RX ORDER — AMOXICILLIN AND CLAVULANATE POTASSIUM 875; 125 MG/1; MG/1
1 TABLET, FILM COATED ORAL 2 TIMES DAILY
Qty: 20 TABLET | Refills: 0 | Status: SHIPPED | OUTPATIENT
Start: 2022-11-21

## 2022-11-21 NOTE — ASSESSMENT & PLAN NOTE
Hypertension is chronic, stable on medication .  Continue current treatment regimen.  Dietary sodium restriction.  Regular aerobic exercise.  Continue current medications.  Ambulatory blood pressure monitoring.  Blood pressure will be reassessed at the next regular appointment.

## 2022-11-21 NOTE — ASSESSMENT & PLAN NOTE
Diabetes is chronic, improving with treatment per patient..   Continue current treatment regimen.  Reminded to bring in blood sugar diary at next visit.  Dietary recommendations for ADA diet.  Regular aerobic exercise.  pt was given referral to talk to someone about diet, has not heard, follow up today  Diabetes will be reassessed in 2 months with new pcp to check A1c   Discuss with new pcp one review log and A1c if metformin is needed since she has never started medication

## 2022-11-21 NOTE — PROGRESS NOTES
"    Chief Complaint  Diabetes and Abrasion (Cat scratch)    Subjective    History of Present Illness {CC  Problem List  Visit  Diagnosis   Encounters  Notes  Medications  Labs  Result Review Imaging  Media :23}     Emilia Squires presents to [facility] for   History of Present Illness   67 female present for follow up visit. She states she is feeling much better compared to last visit. She is taking glipizide daily however never too metformin. She has had BS on averge 160. She is checking more in morning then in the evening.      End of last week, states cat bite her. She has erythemaotus area on L forearm.     Has notice some palpitation at night on occasion.      Social History     Socioeconomic History   • Marital status:    Tobacco Use   • Smoking status: Never   • Smokeless tobacco: Never   Vaping Use   • Vaping Use: Never used   Substance and Sexual Activity   • Alcohol use: Yes     Alcohol/week: 2.0 - 3.0 standard drinks     Types: 2 - 3 Glasses of wine per week     Comment: daily wine   • Drug use: Never   • Sexual activity: Defer      Objective     Vital Signs:   /62   Pulse 118   Temp 97.5 °F (36.4 °C)   Ht 167.6 cm (66\")   Wt 78 kg (172 lb)   SpO2 99%   BMI 27.76 kg/m²   Physical Exam  Constitutional:       General: She is not in acute distress.     Appearance: She is not ill-appearing.   HENT:      Head: Normocephalic.   Cardiovascular:      Rate and Rhythm: Regular rhythm.      Heart sounds: Normal heart sounds.   Pulmonary:      Effort: No respiratory distress.      Breath sounds: Normal breath sounds. No wheezing.   Musculoskeletal:      Right lower leg: No edema.   Skin:     Findings: Erythema and lesion present.      Comments: L forearm erythematous laceration  No drainage   Neurological:      Mental Status: She is alert.        Result Review  Data Reviewed:{ Labs  Result Review  Imaging  Med Tab  Media :23}   The following data was " reviewed by: Lisa Rascon MD on 11/21/2022  Lab Results - Last 18 Months   Lab Units 10/14/22  0509 10/13/22  1715   BUN mg/dL 13 18   CREATININE mg/dL 0.64 0.98   SODIUM mmol/L 132* 128*   POTASSIUM mmol/L 4.1 4.5   CHLORIDE mmol/L 100 88*   CALCIUM mg/dL 8.6 9.2   ALBUMIN g/dL 3.00* 4.30   BILIRUBIN mg/dL 0.8 0.7   ALK PHOS U/L 63 91   AST (SGOT) U/L 22 35*   ALT (SGPT) U/L 28 41*   HEMOGLOBIN A1C %  --  10.20*   WBC 10*3/mm3 8.22 8.65   RBC 10*6/mm3 3.74* 4.70   HEMATOCRIT % 38.5 49.0*   MCV fL 102.9* 104.3*   MCH pg 36.1* 36.2*   TSH uIU/mL  --  2.560              Current Outpatient Medications:   •  Blood Glucose Monitoring Suppl (OneTouch Verio Flex System) w/Device kit, Use to test blood glucose up to four times daily as needed., Disp: 1 kit, Rfl: 0  •  Cholecalciferol (VITAMIN D3) 5000 UNITS capsule capsule, Take  by mouth., Disp: , Rfl:   •  glipizide (Glucotrol) 5 MG tablet, Take 1 tablet by mouth 2 (Two) Times a Day Before Meals., Disp: 60 tablet, Rfl: 0  •  glucose blood test strip, Use to test blood glucose up to four times daily as needed., Disp: 100 each, Rfl: 0  •  Lancets misc, Use to test blood glucose up to four times daily as needed., Disp: 100 each, Rfl: 0  •  losartan (Cozaar) 25 MG tablet, Take 1 tablet by mouth Daily., Disp: 30 tablet, Rfl: 0  •  Multiple Vitamin (MULTI VITAMIN DAILY PO), Take  by mouth., Disp: , Rfl:   •  Triprolidine-Pseudoephedrine (ACTIFED PO), Take  by mouth daily., Disp: , Rfl:   •  amoxicillin-clavulanate (Augmentin) 875-125 MG per tablet, Take 1 tablet by mouth 2 (Two) Times a Day., Disp: 20 tablet, Rfl: 0  •  metFORMIN ER (GLUCOPHAGE-XR) 750 MG 24 hr tablet, Take 1 tablet by mouth Daily With Breakfast., Disp: 30 tablet, Rfl: 0      Assessment and Plan {CC Problem List  Visit Diagnosis  ROS  Review (Popup)  Health Maintenance  Quality  BestPractice  Medications  SmartSets  SnapShot Encounters  Media :23}   Problem List Items Addressed This Visit         Cardiac and Vasculature    Benign essential HTN    Current Assessment & Plan     Hypertension is chronic, stable on medication .  Continue current treatment regimen.  Dietary sodium restriction.  Regular aerobic exercise.  Continue current medications.  Ambulatory blood pressure monitoring.  Blood pressure will be reassessed at the next regular appointment.            Endocrine and Metabolic    Type 2 diabetes mellitus with hyperglycemia, without long-term current use of insulin (HCC) - Primary    Overview     10/2022: New dx with A1C 10.2%         Current Assessment & Plan     Diabetes is chronic, improving with treatment per patient..   Continue current treatment regimen.  Reminded to bring in blood sugar diary at next visit.  Dietary recommendations for ADA diet.  Regular aerobic exercise.  pt was given referral to talk to someone about diet, has not heard, follow up today  Diabetes will be reassessed in 2 months with new pcp to check A1c         Other Visit Diagnoses     Cat bite, initial encounter        Relevant Medications    amoxicillin-clavulanate (Augmentin) 875-125 MG per tablet          Monitor HR, advise follow up with new pcp, check BP and HR at home keep log.   May need additional medication if HR does not improve. Review cardiac workup in hospital.       Follow Up   Return in about 1 month (around 12/21/2022) for Recheck HR with new provider soon. keep log of BS and bring to next visit with pcp. keep margarito w/ gyn.  Patient was given instructions and counseling regarding her condition or for health maintenance advice. Please see specific information pulled into the AVS if appropriate.    EpicAct:[email or web address]_ORDERS,RunParams:STARTUPTYPE=FOLLOW    [email or web address]_DISCHARGE

## 2022-12-01 DIAGNOSIS — I10 BENIGN ESSENTIAL HTN: ICD-10-CM

## 2022-12-01 RX ORDER — LOSARTAN POTASSIUM 25 MG/1
25 TABLET ORAL DAILY
Qty: 30 TABLET | Refills: 0 | Status: SHIPPED | OUTPATIENT
Start: 2022-12-01 | End: 2022-12-06 | Stop reason: SDUPTHER

## 2022-12-01 NOTE — TELEPHONE ENCOUNTER
Rx Refill Note  Requested Prescriptions     Pending Prescriptions Disp Refills   • losartan (COZAAR) 25 MG tablet [Pharmacy Med Name: LOSARTAN 25MG TABLETS] 30 tablet 0     Sig: TAKE 1 TABLET BY MOUTH DAILY      Last office visit with prescribing clinician: 10/26/2022   Last telemedicine visit with prescribing clinician: Visit date not found   Next office visit with prescribing clinician: Visit date not found   {  Rosalind Shirley MA  12/01/22, 14:31 EST

## 2022-12-06 DIAGNOSIS — I10 BENIGN ESSENTIAL HTN: ICD-10-CM

## 2022-12-06 NOTE — TELEPHONE ENCOUNTER
Caller: Emilia Squires    Relationship: Self    Best call back number:[phone]    Requested Prescriptions:   Requested Prescriptions     Pending Prescriptions Disp Refills   • losartan (COZAAR) 25 MG tablet 30 tablet 0     Sig: Take 1 tablet by mouth Daily.        Pharmacy where request should be sent: [id number] - [address] - [phone]  - [phone] FX     Additional details provided by patient: PATIENT REQUESTING A 90 DAY SUPPLY.    PATIENT COMPLETELY OUT.    PATIENT STATED PHARMACY NEVER RECEIVED REQUEST    Does the patient have less than a 3 day supply:  [x] Yes  [] No    Would you like a call back once the refill request has been completed: [x] Yes [] No    If the office needs to give you a call back, can they leave a voicemail: [x] Yes [] No    Parviz Funes Rep   12/06/22 10:05 EST

## 2022-12-07 RX ORDER — LOSARTAN POTASSIUM 25 MG/1
25 TABLET ORAL DAILY
Qty: 30 TABLET | Refills: 0 | Status: SHIPPED | OUTPATIENT
Start: 2022-12-07 | End: 2022-12-09

## 2022-12-09 DIAGNOSIS — I10 BENIGN ESSENTIAL HTN: ICD-10-CM

## 2022-12-09 RX ORDER — LOSARTAN POTASSIUM 25 MG/1
25 TABLET ORAL DAILY
Qty: 90 TABLET | Refills: 1 | Status: SHIPPED | OUTPATIENT
Start: 2022-12-09

## 2022-12-13 RX ORDER — GLIPIZIDE 5 MG/1
TABLET ORAL
Qty: 60 TABLET | Refills: 0 | Status: SHIPPED | OUTPATIENT
Start: 2022-12-13 | End: 2022-12-14

## 2022-12-13 NOTE — TELEPHONE ENCOUNTER
Please call advise I have given this refill but she will need to make follow up to continue refill or set up with new provider.

## 2022-12-14 RX ORDER — GLIPIZIDE 5 MG/1
TABLET ORAL
Qty: 180 TABLET | Refills: 0 | Status: SHIPPED | OUTPATIENT
Start: 2022-12-14

## 2023-02-08 NOTE — TELEPHONE ENCOUNTER
Caller: Emilia Squires    Relationship: Self    Best call back number: [phone]    Requested Prescriptions:   Requested Prescriptions     Pending Prescriptions Disp Refills   • glipizide (Glucotrol) 5 MG tablet 60 tablet 0     Sig: Take 1 tablet by mouth 2 (Two) Times a Day Before Meals.        Pharmacy where request should be sent: [facility] #[id number] [address] - [phone]  - [phone] FX     Additional details provided by patient: PATIENT IS OUT AFTER TODAY 11/10/22    Does the patient have less than a 3 day supply:  [x] Yes  [] No    Parviz Dior Rep   11/10/22 09:44 EST        present

## 2023-04-20 RX ORDER — GLIPIZIDE 5 MG/1
TABLET ORAL
Qty: 180 TABLET | OUTPATIENT
Start: 2023-04-20

## 2023-04-20 RX ORDER — GLIPIZIDE 5 MG/1
TABLET ORAL
Qty: 60 TABLET | Refills: 0 | Status: SHIPPED | OUTPATIENT
Start: 2023-04-20

## 2023-05-26 ENCOUNTER — OFFICE VISIT (OUTPATIENT)
Dept: INTERNAL MEDICINE | Facility: CLINIC | Age: 68
End: 2023-05-26
Payer: COMMERCIAL

## 2023-05-26 VITALS
WEIGHT: 177.6 LBS | SYSTOLIC BLOOD PRESSURE: 140 MMHG | BODY MASS INDEX: 28.54 KG/M2 | HEART RATE: 108 BPM | DIASTOLIC BLOOD PRESSURE: 80 MMHG | OXYGEN SATURATION: 97 % | HEIGHT: 66 IN

## 2023-05-26 DIAGNOSIS — F10.10 AA (ALCOHOL ABUSE): ICD-10-CM

## 2023-05-26 DIAGNOSIS — I10 BENIGN ESSENTIAL HTN: Primary | Chronic | ICD-10-CM

## 2023-05-26 DIAGNOSIS — E11.65 TYPE 2 DIABETES MELLITUS WITH HYPERGLYCEMIA, WITHOUT LONG-TERM CURRENT USE OF INSULIN: Chronic | ICD-10-CM

## 2023-05-26 PROBLEM — R73.9 HYPERGLYCEMIA: Status: RESOLVED | Noted: 2022-10-13 | Resolved: 2023-05-26

## 2023-05-26 PROCEDURE — 99214 OFFICE O/P EST MOD 30 MIN: CPT | Performed by: NURSE PRACTITIONER

## 2023-05-26 NOTE — PROGRESS NOTES
Chief Complaint  Diabetes and Establish Care     Subjective:      History of Present Illness {CC  Problem List  Visit  Diagnosis   Encounters  Notes  Medications  Labs  Result Review Imaging  Media :23}     Emilia Squires presents to [facility] for:      This patient was previously with SHIVAM Rascon MD who is no longer at this practice.    She is new to me and is here to establish care today.  The patient was last seen on:  Prior records reviewed.   The patient chronically has: hypertension, diabetes (new 10/2022 with A1C 10.2%), hx diverticulitis and short term ileostomy     Currently on Augmentin for cat bite on 2023  No fever or chills.     Hypertension: chronic and continues losartan.   No CP, SOA    Diabetes: new 10/22 - not compliant with diet  Denies polydipsia, polyphagia, polyuria.     Abd CT / US in 2022: showed endometrial thickening.  Fm hx endometrial cancer.   She is to follow up with GYN. She states she is making appointment with GYN.  States she sees Dr Peoples - number given to friend today and states she will call to schedule appointment.   Mother had ovarian cancer.   Also: hepatic steatosis     Alcohol Abuse:   1 bottle of wine daily.  +Beer.  Denies liquor.   Started at 4 am this morning.   States 2-3 months ago worsen.   Son  of heroine OD several years ago.      Rehab today: 6PM   [place]   [facility]: [phone]    Friend is here with her today.  Permission given to discuss with her by patient.   Yesterday went to her house and there was protein bars but no food.  She has not been showing up to work for several months. House was in disarray.     Daughter lives in [facility].     Works as [employer] at [employer].     [facility]     I have reviewed patient's medical history, any new submitted information provided by patient or medical assistant and updated medical record.  "     Objective:      Physical Exam  Vitals reviewed.   Constitutional:       Appearance: Normal appearance. She is well-developed.   Neck:      Thyroid: No thyromegaly.   Cardiovascular:      Rate and Rhythm: Normal rate and regular rhythm.      Pulses: Normal pulses.      Heart sounds: Normal heart sounds.   Pulmonary:      Effort: Pulmonary effort is normal.      Breath sounds: Normal breath sounds.      Comments: E/U   Abdominal:      General: Bowel sounds are normal.   Musculoskeletal:      Right lower leg: No edema.      Left lower leg: No edema.   Lymphadenopathy:      Cervical: No cervical adenopathy.   Skin:     General: Skin is warm and dry.      Capillary Refill: Capillary refill takes 2 to 3 seconds.   Neurological:      Mental Status: She is alert and oriented to person, place, and time.   Psychiatric:         Behavior: Behavior is cooperative.        Result Review  Data Reviewed:{ Labs  Result Review  Imaging  Med Tab  Media :23}     The following data was reviewed by: Evgeny Andrew III, NP-C on 05/26/2023  Common labs        10/13/2022    17:15 10/14/2022    05:09   Common Labs   Glucose 490   273     BUN 18   13     Creatinine 0.98   0.64     Sodium 128   132     Potassium 4.5   4.1     Chloride 88   100     Calcium 9.2   8.6     Albumin 4.30   3.00     Total Bilirubin 0.7   0.8     Alkaline Phosphatase 91   63     AST (SGOT) 35   22     ALT (SGPT) 41   28     WBC 8.65   8.22     Hemoglobin 17.0   13.5     Hematocrit 49.0   38.5     Platelets 318   261     Hemoglobin A1C 10.20               Vital Signs:   /80 (BP Location: Left arm, Patient Position: Sitting, Cuff Size: Adult)   Pulse 108   Ht 167.6 cm (66\")   Wt 80.6 kg (177 lb 9.6 oz)   SpO2 97%   BMI 28.67 kg/m²         Requested Prescriptions      No prescriptions requested or ordered in this encounter       Routine medications provided by this office will also be refilled via pharmacy request.       Current Outpatient " Medications:   •  amoxicillin-clavulanate (Augmentin) 875-125 MG per tablet, Take 1 tablet by mouth 2 (Two) Times a Day., Disp: 20 tablet, Rfl: 0  •  Blood Glucose Monitoring Suppl (OneTouch Verio Flex System) w/Device kit, Use to test blood glucose up to four times daily as needed., Disp: 1 kit, Rfl: 0  •  Cholecalciferol (VITAMIN D3) 5000 UNITS capsule capsule, Take  by mouth., Disp: , Rfl:   •  glipizide (GLUCOTROL) 5 MG tablet, TAKE 1 TABLET BY MOUTH TWICE DAILY BEFORE MEALS, Disp: 60 tablet, Rfl: 0  •  glucose blood test strip, Use to test blood glucose up to four times daily as needed., Disp: 100 each, Rfl: 0  •  Lancets misc, Use to test blood glucose up to four times daily as needed., Disp: 100 each, Rfl: 0  •  losartan (COZAAR) 25 MG tablet, TAKE 1 TABLET BY MOUTH DAILY, Disp: 90 tablet, Rfl: 1  •  Multiple Vitamin (MULTI VITAMIN DAILY PO), Take  by mouth., Disp: , Rfl:   •  Triprolidine-Pseudoephedrine (ACTIFED PO), Take  by mouth daily., Disp: , Rfl:      Assessment and Plan:      Assessment and Plan {CC Problem List  Visit Diagnosis  ROS  Review (Popup)  Health Maintenance  Quality  BestPractice  Medications  SmartSets  SnapShot Encounters  Media :23}     Problem List Items Addressed This Visit        Cardiac and Vasculature    Benign essential HTN - Primary (Chronic)    Current Assessment & Plan     Hypertension is improving with treatment.  Continue current treatment regimen.  Dietary sodium restriction.  Regular aerobic exercise.  Blood pressure will be reassessed at the next regular appointment.         Relevant Orders    Comprehensive Metabolic Panel    Lipid Panel With LDL / HDL Ratio    CBC (No Diff)    Hemoglobin A1c       Endocrine and Metabolic    Type 2 diabetes mellitus with hyperglycemia, without long-term current use of insulin (Chronic)    Overview     10/2022: New dx with A1C 10.2%         Current Assessment & Plan     Likely not controlled - she denies symptoms.     Will  check A1C today and may need to modify her treatment plan.     Will need to consider she has been drinking daily - so once she stops, sugar consumption will likely decrease.          Relevant Orders    Comprehensive Metabolic Panel    Hemoglobin A1c    Microalbumin / Creatinine Urine Ratio - Urine, Clean Catch       Mental Health    AA (alcohol abuse) (Chronic)    Current Assessment & Plan     Plans to go inpatient today.     Her friend states she and another friend will be support system when she gets out and will go to AA with her.     They are aware they need to send me updates from rehab.      [place] paperwork completed for patient to enter rehab.             Follow Up {Instructions Charge Capture  Follow-up Communications :23}     Return in about 3 months (around 8/26/2023) for Annual physical.      Patient was given instructions and counseling regarding her condition or for health maintenance advice. Please see specific information pulled into the AVS if appropriate.    Eloiseon disclaimer:   Much of this encounter note is an electronic transcription/translation of spoken language to printed text. The electronic translation of spoken language may permit erroneous, or at times, nonsensical words or phrases to be inadvertently transcribed; Although I have reviewed the note for such errors, some may still exist.     Additional Patient Counseling:       There are no Patient Instructions on file for this visit.

## 2023-05-26 NOTE — ASSESSMENT & PLAN NOTE
Likely not controlled - she denies symptoms.     Will check A1C today and may need to modify her treatment plan.     Will need to consider she has been drinking daily - so once she stops, sugar consumption will likely decrease.

## 2023-05-26 NOTE — ASSESSMENT & PLAN NOTE
Plans to go inpatient today.     Her friend states she and another friend will be support system when she gets out and will go to AA with her.     They are aware they need to send me updates from rehab.      [place] paperwork completed for patient to enter rehab.

## 2023-05-27 LAB
ALBUMIN SERPL-MCNC: 4.7 G/DL (ref 3.5–5.2)
ALBUMIN/CREAT UR: 12 MG/G CREAT (ref 0–29)
ALBUMIN/GLOB SERPL: 1.6 G/DL
ALP SERPL-CCNC: 90 U/L (ref 39–117)
ALT SERPL-CCNC: 98 U/L (ref 1–33)
AST SERPL-CCNC: 127 U/L (ref 1–32)
BILIRUB SERPL-MCNC: 0.7 MG/DL (ref 0–1.2)
BUN SERPL-MCNC: 13 MG/DL (ref 8–23)
BUN/CREAT SERPL: 17.1 (ref 7–25)
CALCIUM SERPL-MCNC: 10.1 MG/DL (ref 8.6–10.5)
CHLORIDE SERPL-SCNC: 100 MMOL/L (ref 98–107)
CHOLEST SERPL-MCNC: 285 MG/DL (ref 0–200)
CO2 SERPL-SCNC: 20.6 MMOL/L (ref 22–29)
CREAT SERPL-MCNC: 0.76 MG/DL (ref 0.57–1)
CREAT UR-MCNC: 143.4 MG/DL
EGFRCR SERPLBLD CKD-EPI 2021: 86 ML/MIN/1.73
ERYTHROCYTE [DISTWIDTH] IN BLOOD BY AUTOMATED COUNT: 13.3 % (ref 12.3–15.4)
GLOBULIN SER CALC-MCNC: 2.9 GM/DL
GLUCOSE SERPL-MCNC: 134 MG/DL (ref 65–99)
HBA1C MFR BLD: 6.1 % (ref 4.8–5.6)
HCT VFR BLD AUTO: 43.2 % (ref 34–46.6)
HDLC SERPL-MCNC: 85 MG/DL (ref 40–60)
HGB BLD-MCNC: 15.2 G/DL (ref 12–15.9)
LDLC SERPL CALC-MCNC: 187 MG/DL (ref 0–100)
LDLC/HDLC SERPL: 2.16 {RATIO}
MCH RBC QN AUTO: 37.2 PG (ref 26.6–33)
MCHC RBC AUTO-ENTMCNC: 35.2 G/DL (ref 31.5–35.7)
MCV RBC AUTO: 105.6 FL (ref 79–97)
MICROALBUMIN UR-MCNC: 17.8 UG/ML
PLATELET # BLD AUTO: 401 10*3/MM3 (ref 140–450)
POTASSIUM SERPL-SCNC: 5 MMOL/L (ref 3.5–5.2)
PROT SERPL-MCNC: 7.6 G/DL (ref 6–8.5)
RBC # BLD AUTO: 4.09 10*6/MM3 (ref 3.77–5.28)
SODIUM SERPL-SCNC: 140 MMOL/L (ref 136–145)
TRIGL SERPL-MCNC: 80 MG/DL (ref 0–150)
VLDLC SERPL CALC-MCNC: 13 MG/DL (ref 5–40)
WBC # BLD AUTO: 7.92 10*3/MM3 (ref 3.4–10.8)

## 2023-05-30 ENCOUNTER — TELEPHONE (OUTPATIENT)
Dept: INTERNAL MEDICINE | Facility: CLINIC | Age: 68
End: 2023-05-30

## 2023-05-30 NOTE — TELEPHONE ENCOUNTER
DARION ORTEGA TO READ      ----- Message from JAQUELINE Rivera III sent at 5/30/2023 10:17 AM EDT -----  Please call and notify  Ms. Squires,  (she may not answer as she is supposed to be inpatient rehab)     Recent lab work: diabetes has improved - she is down to 6.1%    Liver enzymes are elevated - this should improve now that she has stopped drinking.     Cholesterol is elevated - we will address this at her next visit after she is out of rehab.     JUAN CARLOS

## 2023-06-01 ENCOUNTER — PATIENT ROUNDING (BHMG ONLY) (OUTPATIENT)
Dept: INTERNAL MEDICINE | Facility: CLINIC | Age: 68
End: 2023-06-01

## 2023-06-08 ENCOUNTER — OFFICE VISIT (OUTPATIENT)
Dept: INTERNAL MEDICINE | Facility: CLINIC | Age: 68
End: 2023-06-08
Payer: COMMERCIAL

## 2023-06-08 VITALS
HEIGHT: 66 IN | OXYGEN SATURATION: 100 % | BODY MASS INDEX: 28.64 KG/M2 | WEIGHT: 178.2 LBS | HEART RATE: 88 BPM | SYSTOLIC BLOOD PRESSURE: 110 MMHG | DIASTOLIC BLOOD PRESSURE: 80 MMHG

## 2023-06-08 DIAGNOSIS — E11.65 TYPE 2 DIABETES MELLITUS WITH HYPERGLYCEMIA, WITHOUT LONG-TERM CURRENT USE OF INSULIN: Chronic | ICD-10-CM

## 2023-06-08 DIAGNOSIS — E78.5 HYPERLIPIDEMIA, UNSPECIFIED HYPERLIPIDEMIA TYPE: Primary | ICD-10-CM

## 2023-06-08 DIAGNOSIS — Z12.11 SCREENING FOR COLON CANCER: ICD-10-CM

## 2023-06-08 DIAGNOSIS — F10.10 AA (ALCOHOL ABUSE): Chronic | ICD-10-CM

## 2023-06-08 DIAGNOSIS — I10 BENIGN ESSENTIAL HTN: ICD-10-CM

## 2023-06-08 RX ORDER — LOSARTAN POTASSIUM 25 MG/1
25 TABLET ORAL DAILY
Qty: 90 TABLET | Refills: 1 | Status: SHIPPED | OUTPATIENT
Start: 2023-06-08

## 2023-06-08 RX ORDER — GLIPIZIDE 5 MG/1
5 TABLET ORAL
Qty: 60 TABLET | Refills: 0 | Status: SHIPPED | OUTPATIENT
Start: 2023-06-08

## 2023-06-08 RX ORDER — ATORVASTATIN CALCIUM 20 MG/1
20 TABLET, FILM COATED ORAL DAILY
Qty: 90 TABLET | Refills: 1 | Status: SHIPPED | OUTPATIENT
Start: 2023-06-08

## 2023-06-08 NOTE — ASSESSMENT & PLAN NOTE
Diabetes is unchanged.   Continue current treatment regimen.  Dietary recommendations for ADA diet.  Regular aerobic exercise.  Diabetes will be reassessed in 6 months.    Your last A1C (3 month average) =   Lab Results   Component Value Date    HGBA1C 6.10 (H) 05/26/2023      Your diabetes is Controlled.    The American Diabetes Association recommends an A1C of less than 7%.  A1C Average Levels Blood Sugar:   6%  126 mg/dL  7%  154 mg/dL  8%  183 mg/dL  9%  212 mg/dL  10%  240 mg/dL  11%  269 mg/dL  12%  298 mg/dL    Glucose goals for many adults with diabetes  Blood sugar before meals  mg/dL  Blood sugar 1-2 hours after the start of a meal Less than 180 mg/dL A1C Less than 7%    Eye Health:   You need a diabetic eye exam yearly.   Please have a copy of the note faxed to my office.   Fax: [phone]    Foot Health:   You need a diabetic foot exam yearly.   Check your feet routinely for any wounds.   You should always check your shoes for any debris that could cause a wound.

## 2023-06-08 NOTE — PROGRESS NOTES
Chief Complaint  Annual Exam and Alcohol Problem     Subjective:      History of Present Illness {CC  Problem List  Visit  Diagnosis   Encounters  Notes  Medications  Labs  Result Review Imaging  Media :23}     Emilia Squires presents to [facility] for:  annual exam excluding GYN exam for female and alcohol abuse follow up    Hypertension  This is a chronic problem. The current episode started more than 1 year ago. The problem is unchanged. The problem is controlled. Associated symptoms include shortness of breath. Pertinent negatives include no anxiety, chest pain or palpitations. There are no associated agents to hypertension. Risk factors for coronary artery disease include diabetes mellitus, family history and sedentary lifestyle. Past treatments include angiotensin blockers. Current antihypertension treatment includes angiotensin blockers. The current treatment provides mild improvement. Compliance problems include exercise.    Diabetes  She presents for her follow-up diabetic visit. She has type 2 diabetes mellitus. Her disease course has been stable. There are no hypoglycemic associated symptoms. Pertinent negatives for diabetes include no chest pain, no polydipsia, no polyphagia and no polyuria. There are no hypoglycemic complications. Symptoms are stable. There are no diabetic complications. Risk factors for coronary artery disease include diabetes mellitus, family history, hypertension and sedentary lifestyle. Current diabetic treatment includes oral agent (monotherapy). She is compliant with treatment all of the time. She is following a generally healthy diet. She rarely participates in exercise. An ACE inhibitor/angiotensin II receptor blocker is being taken. Sees podiatrist: referral placed.Eye exam is not current.      Denies chest pain, shortness of breath, palpitations.     Alcohol abuse- Was at rehab for 10 days. Got out on Tuesday and has gone to  AA meetings since being home. Went out with friends for lunch yesterday. Has 12 weeks off from work. She is getting a sponsor tomorrow. Starting outpatient and will be having urine test.  is being supportive and stopped drinking with her so therefore, no alcohol in the home. Walking the dog daily. Has hand out for AA meetings with times and location. Getting support from family and friends.   Aware Recovery: [phone]     Emilia is here for coordination of medical care, to discuss health maintenance, disease prevention as well as to followup on medical problems.     Patient Care Team:  Evgeny Andrew III, NP-C as PCP - General (Internal Medicine)  Shelton, Amy Ruiz MD as Consulting Physician (Obstetrics and Gynecology)     Activity level is minimal.   Exercises 0 per week.     Weight trend is     Wt Readings from Last 4 Encounters:   06/08/23 80.8 kg (178 lb 3.2 oz)   05/26/23 80.6 kg (177 lb 9.6 oz)   05/02/23 81.6 kg (180 lb)   11/21/22 78 kg (172 lb)         Health Maintenance Female:    GYN: Dr Peoples  Last gynecology appointment: has appt on 6/26/2023  Patient's last mammogram was 7/31/2012? States will schedule at GYN appt.   Advised routine self-breast exams monthly.  Sexually active: yes  Pap smears have been: negative    Colon cancer screen:     She has no change in bowel habits.   Patient's last colonoscopy was never.       Vaccines:      Last eye exam: will make appt    Advised regular sunscreen.        Her cardiovascular risks are:     [] No Known risk factors    [x] Hypertension   [] Hyperlipidemia  [x] Diabetes    [] Obesity  [] Family history   [] Current or hx tobacco use  [x] Sedentary lifestyle   [] Post-menopausal      I have reviewed patient's medical history, any new submitted information provided by patient or medical assistant and updated medical record.      Objective:      Physical Exam  Vitals reviewed.   Constitutional:       Appearance: Normal appearance. She is  well-developed.   Neck:      Thyroid: No thyromegaly.   Cardiovascular:      Rate and Rhythm: Normal rate and regular rhythm.      Pulses: Normal pulses.      Heart sounds: Normal heart sounds.   Pulmonary:      Effort: Pulmonary effort is normal.      Breath sounds: Normal breath sounds.      Comments: E/U   Abdominal:      General: Bowel sounds are normal.      Palpations: Abdomen is soft.   Musculoskeletal:         General: Normal range of motion.      Cervical back: Normal range of motion and neck supple.   Feet:      Comments: Diabetic Foot Exam Performed and Monofilament Test Performed        Lymphadenopathy:      Cervical: No cervical adenopathy.   Skin:     General: Skin is warm and dry.      Capillary Refill: Capillary refill takes 2 to 3 seconds.   Neurological:      Mental Status: She is alert and oriented to person, place, and time.   Psychiatric:         Mood and Affect: Mood normal.         Behavior: Behavior normal. Behavior is cooperative.      Result Review  Data Reviewed:{ Labs  Result Review  Imaging  Med Tab  Media :23}     The following data was reviewed by: Evgeny Andrew III, NP-C on 06/08/2023  Common labs          10/13/2022    17:15 10/14/2022    05:09 5/26/2023    10:51   Common Labs   Glucose 490  273  134    BUN 18  13  13    Creatinine 0.98  0.64  0.76    Sodium 128  132  140    Potassium 4.5  4.1  5.0    Chloride 88  100  100    Calcium 9.2  8.6  10.1    Total Protein   7.6    Albumin 4.30  3.00  4.7    Total Bilirubin 0.7  0.8  0.7    Alkaline Phosphatase 91  63  90    AST (SGOT) 35  22  127    ALT (SGPT) 41  28  98    WBC 8.65  8.22  7.92    Hemoglobin 17.0  13.5  15.2    Hematocrit 49.0  38.5  43.2    Platelets 318  261  401    Total Cholesterol   285    Triglycerides   80    HDL Cholesterol   85    LDL Cholesterol    187    Hemoglobin A1C 10.20   6.10    Microalbumin, Urine   17.8      Lab Results - Last 18 Months   Lab Units 05/26/23  1051 10/14/22  1079  "10/13/22  1715   BUN mg/dL 13 13 18   CREATININE mg/dL 0.76 0.64 0.98   SODIUM mmol/L 140 132* 128*   POTASSIUM mmol/L 5.0 4.1 4.5   CHLORIDE mmol/L 100 100 88*   CALCIUM mg/dL 10.1 8.6 9.2   ALBUMIN g/dL 4.7 3.00* 4.30   BILIRUBIN mg/dL 0.7 0.8 0.7   ALK PHOS U/L 90 63 91   AST (SGOT) U/L 127* 22 35*   ALT (SGPT) U/L 98* 28 41*   CHOLESTEROL mg/dL 285*  --   --    TRIGLYCERIDES mg/dL 80  --   --    HDL CHOL mg/dL 85*  --   --    VLDL CHOLESTEROL ELI mg/dL 13  --   --    LDL CHOL mg/dL 187*  --   --    LDL/HDL RATIO  2.16  --   --    HEMOGLOBIN A1C % 6.10*  --  10.20*   MICROALB UR ug/mL 17.8  --   --    WBC 10*3/mm3 7.92 8.22 8.65   RBC 10*6/mm3 4.09 3.74* 4.70   HEMATOCRIT % 43.2 38.5 49.0*   MCV fL 105.6* 102.9* 104.3*   MCH pg 37.2* 36.1* 36.2*   TSH uIU/mL  --   --  2.560          Vital Signs:   /80 (BP Location: Left arm, Patient Position: Sitting, Cuff Size: Adult)   Pulse 88   Ht 167.6 cm (66\")   Wt 80.8 kg (178 lb 3.2 oz)   SpO2 100%   BMI 28.76 kg/m²         Requested Prescriptions     Signed Prescriptions Disp Refills    glipizide (GLUCOTROL) 5 MG tablet 60 tablet 0     Sig: Take 1 tablet by mouth 2 (Two) Times a Day Before Meals.    losartan (COZAAR) 25 MG tablet 90 tablet 1     Sig: Take 1 tablet by mouth Daily.    atorvastatin (Lipitor) 20 MG tablet 90 tablet 1     Sig: Take 1 tablet by mouth Daily.       Routine medications provided by this office will also be refilled via pharmacy request.       Current Outpatient Medications:     Blood Glucose Monitoring Suppl (OneTouch Verio Flex System) w/Device kit, Use to test blood glucose up to four times daily as needed., Disp: 1 kit, Rfl: 0    Cholecalciferol (VITAMIN D3) 5000 UNITS capsule capsule, Take  by mouth., Disp: , Rfl:     glipizide (GLUCOTROL) 5 MG tablet, Take 1 tablet by mouth 2 (Two) Times a Day Before Meals., Disp: 60 tablet, Rfl: 0    glucose blood test strip, Use to test blood glucose up to four times daily as needed., Disp: 100 " each, Rfl: 0    Lancets misc, Use to test blood glucose up to four times daily as needed., Disp: 100 each, Rfl: 0    losartan (COZAAR) 25 MG tablet, Take 1 tablet by mouth Daily., Disp: 90 tablet, Rfl: 1    Multiple Vitamin (MULTI VITAMIN DAILY PO), Take  by mouth., Disp: , Rfl:     Triprolidine-Pseudoephedrine (ACTIFED PO), Take  by mouth daily., Disp: , Rfl:     atorvastatin (Lipitor) 20 MG tablet, Take 1 tablet by mouth Daily., Disp: 90 tablet, Rfl: 1     Assessment and Plan:      Assessment and Plan {CC Problem List  Visit Diagnosis  ROS  Review (Popup)  Health Maintenance  Quality  BestPractice  Medications  SmartSets  SnapShot Encounters  Media :23}     Problem List Items Addressed This Visit          Cardiac and Vasculature    Benign essential HTN (Chronic)    Current Assessment & Plan     Hypertension is unchanged.  Continue current treatment regimen.  Dietary sodium restriction.  Regular aerobic exercise.  Blood pressure will be reassessed at the next regular appointment.         Relevant Medications    losartan (COZAAR) 25 MG tablet    Hyperlipidemia - Primary    Overview     6/8/2023: start lipitor          Current Assessment & Plan     Lipid abnormalities are worsening.  Pharmacotherapy as ordered.  Lipids will be reassessed in 6 months.    I discussed medication use, dosing and possible side effects.   Patient was given opportunity to ask any questions.           Relevant Medications    atorvastatin (Lipitor) 20 MG tablet       Endocrine and Metabolic    Type 2 diabetes mellitus with hyperglycemia, without long-term current use of insulin (Chronic)    Overview     10/2022: New dx with A1C 10.2%         Current Assessment & Plan     Diabetes is unchanged.   Continue current treatment regimen.  Dietary recommendations for ADA diet.  Regular aerobic exercise.  Diabetes will be reassessed in 6 months.    Your last A1C (3 month average) =   Lab Results   Component Value Date    HGBA1C 6.10 (H)  05/26/2023      Your diabetes is Controlled.    The American Diabetes Association recommends an A1C of less than 7%.  A1C Average Levels Blood Sugar:   6%  126 mg/dL  7%  154 mg/dL  8%  183 mg/dL  9%  212 mg/dL  10%  240 mg/dL  11%  269 mg/dL  12%  298 mg/dL    Glucose goals for many adults with diabetes  Blood sugar before meals  mg/dL  Blood sugar 1-2 hours after the start of a meal Less than 180 mg/dL A1C Less than 7%    Eye Health:   You need a diabetic eye exam yearly.   Please have a copy of the note faxed to my office.   Fax: [phone]    Foot Health:   You need a diabetic foot exam yearly.   Check your feet routinely for any wounds.   You should always check your shoes for any debris that could cause a wound.             Relevant Medications    glipizide (GLUCOTROL) 5 MG tablet    Other Relevant Orders    Ambulatory Referral to Podiatry (Completed)       Mental Health    AA (alcohol abuse) (Chronic)    Current Assessment & Plan     Recently completed 10 days of rehab. Now going to AA and starting and outpatient program. Will have urine tox performed.           Other Visit Diagnoses       Screening for colon cancer        Relevant Orders    Cologuard - Stool, Per Rectum          Educated to continue AA meetings and go to extra meetings if there are any urges. Continue exercise and drinking water. Lean on family and friends for support.     Follow Up {Instructions Charge Capture  Follow-up Communications :23}     Return in about 3 months (around 9/8/2023) for Recheck hyperlipidemia.      Patient was given instructions and counseling regarding her condition or for health maintenance advice. Please see specific information pulled into the AVS if appropriate.    Princess disclaimer:   Much of this encounter note is an electronic transcription/translation of spoken language to printed text. The electronic translation of spoken language may permit erroneous, or at times, nonsensical words or phrases to be  inadvertently transcribed; Although I have reviewed the note for such errors, some may still exist.     Additional Patient Counseling:       Patient Instructions     Summer is coming!   Health Information:     Stay hydrated when outside  If your urine starts to get darker, you are not drinking enough     Protect yourself from ticks and mosquitos  Here are the best ingredients to look for:  DEET (N,N-diethyl-m-toluamide or N,N-diethyl-3-methyl-benzamide)  Picaridin  Oil of lemon eucalyptus (p-menthane-3,8-diol or PMD)  Wear light-colored pants so you can spot ticks easier  If you use a permethrin product, ONLY apply to clothing    Practice Safe Sun!    Use sun screen SPF >50 daily, reapply regularly per directions on package  See dermatologist for skin check regularly  Protect your eyes with sunglasses with UV protection    Poison Ivy  If you are going into areas that may have poison ivy, prepare with a product like IvyX or other ivy blocker.  Wash your clothes and pets after being in an area with ivy when returning home. If you come in contact with poison ivy, try a product like Technu     Other things you should incorporate all year...     Diet:    Eat vegetables, fruits, whole grain, low-fat dairy, poultry, fish, beans, nontropical vegetable oils, and nuts, but avoid red meat (i.e., Mediterranean-style diet, DASH [Dietary Approaches to Stop Hypertension] diet).  Limit sugary drinks and sweets.  Limit saturated and trans fat to 5% to 6% of calories.  Limit sodium intake to 2,400 mg daily (about one teaspoon table salt [kosher/sea salt have less sodium per teaspoon]).  https://www.eatright.org/    Weight loss / Calorie Counting Apps:    Lose It!   MyFitness Pal   Works great when you try it with a partner/ friend. It takes about 15 minutes a day but studies show that this simple method of monitoring your intake can help you achieve goals as it keeps you accountable.  I often will ask patients to try these apps just  to get an idea of how much sodium and how many carbohydrates you are taking in.   Exercise:   Engage in moderate-to-vigorous aerobic activity for at least 40 minutes (on average) three to four times each week.  Wearables:   Activity tracker   Step tracker: getting 7,500 steps daily can cut your cardiac risks by 44%   Bone Health:   [email or web address]  Routine weight bearing exercise      Please Note: Summer 2023 our office will be moving to our NEW LOCATION:   [address] [place] [address] [place]: [address]  Please verify location of your next appointment on [facility]  (moved is expected end of July or August)

## 2023-06-08 NOTE — PATIENT INSTRUCTIONS
Summer is coming!   Health Information:     Stay hydrated when outside  If your urine starts to get darker, you are not drinking enough     Protect yourself from ticks and mosquitos  Here are the best ingredients to look for:  DEET (N,N-diethyl-m-toluamide or N,N-diethyl-3-methyl-benzamide)  Picaridin  Oil of lemon eucalyptus (p-menthane-3,8-diol or PMD)  Wear light-colored pants so you can spot ticks easier  If you use a permethrin product, ONLY apply to clothing    Practice Safe Sun!    Use sun screen SPF >50 daily, reapply regularly per directions on package  See dermatologist for skin check regularly  Protect your eyes with sunglasses with UV protection    Poison Ivy  If you are going into areas that may have poison ivy, prepare with a product like IvyX or other ivy blocker.  Wash your clothes and pets after being in an area with ivy when returning home. If you come in contact with poison ivy, try a product like Technu     Other things you should incorporate all year...     Diet:    Eat vegetables, fruits, whole grain, low-fat dairy, poultry, fish, beans, nontropical vegetable oils, and nuts, but avoid red meat (i.e., Mediterranean-style diet, DASH [Dietary Approaches to Stop Hypertension] diet).  Limit sugary drinks and sweets.  Limit saturated and trans fat to 5% to 6% of calories.  Limit sodium intake to 2,400 mg daily (about one teaspoon table salt [kosher/sea salt have less sodium per teaspoon]).  https://www.eatright.org/    Weight loss / Calorie Counting Apps:    Lose It!   MyFitness Pal   Works great when you try it with a partner/ friend. It takes about 15 minutes a day but studies show that this simple method of monitoring your intake can help you achieve goals as it keeps you accountable.  I often will ask patients to try these apps just to get an idea of how much sodium and how many carbohydrates you are taking in.   Exercise:   Engage in moderate-to-vigorous aerobic activity for at least 40  minutes (on average) three to four times each week.  Wearables:   Activity tracker   Step tracker: getting 7,500 steps daily can cut your cardiac risks by 44%   Bone Health:   [email or web address]  Routine weight bearing exercise      Please Note: Summer 2023 our office will be moving to our NEW LOCATION:   [address] [place] [address] [place]: [address]  Please verify location of your next appointment on [facility]  (moved is expected end of July or August)

## 2023-06-08 NOTE — ASSESSMENT & PLAN NOTE
Recently completed 10 days of rehab. Now going to AA and starting and outpatient program. Will have urine tox performed.

## 2023-06-08 NOTE — ASSESSMENT & PLAN NOTE
Lipid abnormalities are worsening.  Pharmacotherapy as ordered.  Lipids will be reassessed in 6 months.    I discussed medication use, dosing and possible side effects.   Patient was given opportunity to ask any questions.

## 2023-07-06 ENCOUNTER — TELEPHONE (OUTPATIENT)
Dept: INTERNAL MEDICINE | Facility: CLINIC | Age: 68
End: 2023-07-06

## 2023-07-06 NOTE — TELEPHONE ENCOUNTER
DARION OKAY TO READ   MAILBOX WAS FULL CANNOT LEAVE MESSAGE   ----- Message from JAQUELINE Rivera III sent at 7/5/2023  5:06 PM EDT -----  Please call her - her liver enzymes are still elevated due to alcohol.   Ensure she is taking her multivitamin daily.     I need to know if she has decided to go back inpatient for rehab.     If so: date?   Last AA meeting?   Last drink?     WCN

## 2023-08-14 DIAGNOSIS — R25.1 TREMORS OF NERVOUS SYSTEM: ICD-10-CM

## 2023-08-14 RX ORDER — PROPRANOLOL HYDROCHLORIDE 40 MG/1
TABLET ORAL
Qty: 180 TABLET | OUTPATIENT
Start: 2023-08-14

## 2023-08-14 RX ORDER — PROPRANOLOL HYDROCHLORIDE 40 MG/1
TABLET ORAL
Qty: 60 TABLET | Refills: 1 | Status: SHIPPED | OUTPATIENT
Start: 2023-08-14

## 2023-10-18 DIAGNOSIS — E11.65 TYPE 2 DIABETES MELLITUS WITH HYPERGLYCEMIA, WITHOUT LONG-TERM CURRENT USE OF INSULIN: Chronic | ICD-10-CM

## 2023-10-18 RX ORDER — GLIPIZIDE 5 MG/1
5 TABLET ORAL
Qty: 60 TABLET | Refills: 0 | OUTPATIENT
Start: 2023-10-18

## 2023-10-23 DIAGNOSIS — E11.65 TYPE 2 DIABETES MELLITUS WITH HYPERGLYCEMIA, WITHOUT LONG-TERM CURRENT USE OF INSULIN: Chronic | ICD-10-CM

## 2023-10-23 RX ORDER — GLIPIZIDE 5 MG/1
5 TABLET ORAL
Qty: 180 TABLET | Refills: 0 | Status: SHIPPED | OUTPATIENT
Start: 2023-10-23

## 2023-10-23 NOTE — TELEPHONE ENCOUNTER
Caller: Emilia Squires    Relationship: Self    Best call back number:     Requested Prescriptions:   Requested Prescriptions     Pending Prescriptions Disp Refills    glipizide (GLUCOTROL) 5 MG tablet 60 tablet 0     Sig: Take 1 tablet by mouth 2 (Two) Times a Day Before Meals.        Pharmacy where request should be sent: [id number] - [place] [id number] AT [address] - [phone]  - [phone] FX     Last office visit with prescribing clinician: 6/30/2023   Last telemedicine visit with prescribing clinician: Visit date not found   Next office visit with prescribing clinician: 11/7/2023     Additional details provided by patient: PATIENT IS ALL OUT OF MEDICATION    Does the patient have less than a 3 day supply:  [x] Yes  [] No    Would you like a call back once the refill request has been completed: [x] Yes [] No    If the office needs to give you a call back, can they leave a voicemail: [] Yes [] No    Parviz Silver Rep   10/23/23 09:39 EDT        PAST SURGICAL HISTORY:  H/O rotator cuff surgery RIGHT

## 2023-12-09 DIAGNOSIS — E11.65 TYPE 2 DIABETES MELLITUS WITH HYPERGLYCEMIA, WITHOUT LONG-TERM CURRENT USE OF INSULIN: Chronic | ICD-10-CM

## 2023-12-11 RX ORDER — GLIPIZIDE 5 MG/1
5 TABLET ORAL
Qty: 180 TABLET | Refills: 0 | OUTPATIENT
Start: 2023-12-11

## 2023-12-12 ENCOUNTER — TELEPHONE (OUTPATIENT)
Dept: INTERNAL MEDICINE | Facility: CLINIC | Age: 68
End: 2023-12-12
Payer: COMMERCIAL

## 2023-12-12 NOTE — TELEPHONE ENCOUNTER
LVM for patient regarding refill request for glipizide. Patient needs an appt before we can refill this for her.     [facility] TO NOTIFY AND SCHEDULE APPT

## 2024-01-23 ENCOUNTER — TELEPHONE (OUTPATIENT)
Dept: INTERNAL MEDICINE | Facility: CLINIC | Age: 69
End: 2024-01-23

## 2024-01-23 NOTE — TELEPHONE ENCOUNTER
Caller: ELENA MTZ    Relationship: Other Relative    Best call back number: [phone]     What was the call regarding: PATIENT'S SISTER IN LAW IS ASKING IF THE PATIENT CAN PLEASE SIGN A NEW BH VERBAL AT HER APPOINTMENT TODAY SO SHE CAN CONTINUE TO HELP HER WITH HER MEDICAL CARE.

## 2024-02-02 ENCOUNTER — OFFICE VISIT (OUTPATIENT)
Dept: INTERNAL MEDICINE | Facility: CLINIC | Age: 69
End: 2024-02-02
Payer: COMMERCIAL

## 2024-02-02 VITALS
SYSTOLIC BLOOD PRESSURE: 120 MMHG | DIASTOLIC BLOOD PRESSURE: 68 MMHG | OXYGEN SATURATION: 97 % | WEIGHT: 178.6 LBS | HEIGHT: 65 IN | BODY MASS INDEX: 29.76 KG/M2 | HEART RATE: 86 BPM

## 2024-02-02 DIAGNOSIS — I10 BENIGN ESSENTIAL HTN: ICD-10-CM

## 2024-02-02 DIAGNOSIS — F10.10 AA (ALCOHOL ABUSE): Primary | Chronic | ICD-10-CM

## 2024-02-02 DIAGNOSIS — E11.65 TYPE 2 DIABETES MELLITUS WITH HYPERGLYCEMIA, WITHOUT LONG-TERM CURRENT USE OF INSULIN: Chronic | ICD-10-CM

## 2024-02-02 DIAGNOSIS — R25.1 TREMORS OF NERVOUS SYSTEM: ICD-10-CM

## 2024-02-02 DIAGNOSIS — Q67.0 ASYMMETRY OF FACE: ICD-10-CM

## 2024-02-02 PROCEDURE — 99214 OFFICE O/P EST MOD 30 MIN: CPT | Performed by: NURSE PRACTITIONER

## 2024-02-02 NOTE — PROGRESS NOTES
Chief Complaint  Diabetes and Shaking (Patient states she feels shaky when she firsts gets up in the morning, blood sugar has been running 90 in the morning. )     Subjective:      History of Present Illness {CC  Problem List  Visit  Diagnosis   Encounters  Notes  Medications  Labs  Result Review Imaging  Media :23}     Emilia Squires presents to [facility] for:      Patient is here with her brother today and agrees to have her health care discussed with him present.     She has diabetes and continues glipizide.   She states mostly compliant with medication.  Not compliant with diet.     They are concerned because in am she is shakey.  However as soon as she drinks wine, resolves.  Glucose 90.     She has had a long hx of alcoholism.     Over the summer: I gave her fmla to go into recovery as she was at risk for losing job at the [employer] due to not showing up.     Marlyn Madden   Aware Recovery: [phone]    Her brother states she had one week in [facility] over the summer.   Brother: states she was in 2 other programs over summer. Outpatient.     He discusses her going back into rehab and has a list of inpatient services: [facility].   I advise her to do this as well.   They have contact information to call.      Also: they are concerned that the right side of her mouth is not symmetrical with left.  No known stroke.  She states it has been like that for months.  No ha, weakness, change in vision.  BP has been controlled.       I have reviewed patient's medical history, any new submitted information provided by patient or medical assistant and updated medical record.      Objective:      Physical Exam  Vitals reviewed.   Constitutional:       Appearance: She is well-developed.   HENT:      Head:      Comments: Small amount of drop right face   Eyes:      General: No scleral icterus.  Neck:      Thyroid: No thyromegaly.   Cardiovascular:       "Rate and Rhythm: Normal rate and regular rhythm.      Pulses: Normal pulses.      Heart sounds: Normal heart sounds.   Pulmonary:      Effort: Pulmonary effort is normal.      Breath sounds: Normal breath sounds.      Comments: E/U   Abdominal:      General: Bowel sounds are normal.      Tenderness: There is no abdominal tenderness. There is no guarding.   Musculoskeletal:         General: Normal range of motion.   Lymphadenopathy:      Cervical: No cervical adenopathy.   Skin:     General: Skin is warm and dry.      Capillary Refill: Capillary refill takes 2 to 3 seconds.   Neurological:      General: No focal deficit present.      Mental Status: She is alert.      Sensory: No sensory deficit.      Motor: No weakness.      Coordination: Coordination normal.   Psychiatric:         Behavior: Behavior is cooperative.        Result Review  Data Reviewed:{ Labs  Result Review  Imaging  Med Tab  Media :23}                Vital Signs:   /68 (BP Location: Left arm, Patient Position: Sitting, Cuff Size: Adult)   Pulse 86   Ht 165.1 cm (65\")   Wt 81 kg (178 lb 9.6 oz)   SpO2 97%   BMI 29.72 kg/m²         Requested Prescriptions      No prescriptions requested or ordered in this encounter       Routine medications provided by this office will also be refilled via pharmacy request.       Current Outpatient Medications:     Blood Glucose Monitoring Suppl (OneTouch Verio Flex System) w/Device kit, Use to test blood glucose up to four times daily as needed., Disp: 1 kit, Rfl: 0    Cholecalciferol (VITAMIN D3) 5000 UNITS capsule capsule, Take  by mouth., Disp: , Rfl:     glipizide (GLUCOTROL) 5 MG tablet, Take 1 tablet by mouth 2 (Two) Times a Day Before Meals., Disp: 180 tablet, Rfl: 0    glucose blood test strip, Use to test blood glucose up to four times daily as needed., Disp: 100 each, Rfl: 0    Lancets misc, Use to test blood glucose up to four times daily as needed., Disp: 100 each, Rfl: 0    losartan " (COZAAR) 25 MG tablet, Take 1 tablet by mouth Daily., Disp: 90 tablet, Rfl: 1    Multiple Vitamin (MULTI VITAMIN DAILY PO), Take  by mouth., Disp: , Rfl:     propranolol (INDERAL) 40 MG tablet, TAKE 1 TABLET BY MOUTH TWICE DAILY, Disp: 60 tablet, Rfl: 1    Triprolidine-Pseudoephedrine (ACTIFED PO), Take  by mouth daily., Disp: , Rfl:      Assessment and Plan:      Assessment and Plan {CC Problem List  Visit Diagnosis  ROS  Review (Popup)  [facility] Maintenance  Quality  BestPractice  Medications  SmartSets  SnapShot Encounters  Media :23}     Problem List Items Addressed This Visit          Cardiac and Vasculature    Benign essential HTN (Chronic)    Current Assessment & Plan     Hypertension is unchanged.  Continue current treatment regimen.  Dietary sodium restriction.  Regular aerobic exercise.  Blood pressure will be reassessed at the next regular appointment.         Relevant Orders    Comprehensive Metabolic Panel (Completed)    CBC (No Diff) (Completed)    Vitamin B12 (Completed)    Ferritin (Completed)    Hemoglobin A1c (Completed)       Endocrine and Metabolic    Type 2 diabetes mellitus with hyperglycemia, without long-term current use of insulin (Chronic)    Overview     10/2022: New dx with A1C 10.2%         Current Assessment & Plan     Doesn't seem like she is having hypoglycemic event - more related to alcohol.     Will recheck A1C today and modify treatment if needed.          Relevant Orders    Comprehensive Metabolic Panel (Completed)    CBC (No Diff) (Completed)    Vitamin B12 (Completed)    Ferritin (Completed)    Hemoglobin A1c (Completed)       Mental Health    AA (alcohol abuse) - Primary (Chronic)    Current Assessment & Plan     I agree that she needs an intensive inpatient recovery program.   They will contact program and let me know if they need assistance.     Discussed effects on liver and long term health.          Relevant Orders    Comprehensive Metabolic Panel (Completed)     CBC (No Diff) (Completed)    Vitamin B12 (Completed)    Ferritin (Completed)    Hemoglobin A1c (Completed)     Other Visit Diagnoses       Tremors of nervous system        Relevant Orders    Comprehensive Metabolic Panel (Completed)    CBC (No Diff) (Completed)    Vitamin B12 (Completed)    Ferritin (Completed)    Hemoglobin A1c (Completed)    Asymmetry of face        Relevant Orders    CT Head Without Contrast          Will get head CT - discussed that if she did have a stroke she would need to be on ASA and statin - RF modification.   However - she has stopped her statin and liver enzymes have been elevated due to alcoholism.     Follow Up {Instructions Charge Capture  Follow-up Communications :23}     Return in about 3 months (around 5/2/2024).      Patient was given instructions and counseling regarding her condition or for health maintenance advice. Please see specific information pulled into the AVS if appropriate.    Dragon disclaimer:   Much of this encounter note is an electronic transcription/translation of spoken language to printed text. The electronic translation of spoken language may permit erroneous, or at times, nonsensical words or phrases to be inadvertently transcribed; Although I have reviewed the note for such errors, some may still exist.     Additional Patient Counseling:       There are no Patient Instructions on file for this visit.

## 2024-02-03 LAB
ALBUMIN SERPL-MCNC: 4 G/DL (ref 3.5–5.2)
ALBUMIN/GLOB SERPL: 1.4 G/DL
ALP SERPL-CCNC: 115 U/L (ref 39–117)
ALT SERPL-CCNC: 126 U/L (ref 1–33)
AST SERPL-CCNC: 124 U/L (ref 1–32)
BILIRUB SERPL-MCNC: 0.8 MG/DL (ref 0–1.2)
BUN SERPL-MCNC: 11 MG/DL (ref 8–23)
BUN/CREAT SERPL: 11.3 (ref 7–25)
CALCIUM SERPL-MCNC: 9.4 MG/DL (ref 8.6–10.5)
CHLORIDE SERPL-SCNC: 96 MMOL/L (ref 98–107)
CO2 SERPL-SCNC: 24 MMOL/L (ref 22–29)
CREAT SERPL-MCNC: 0.97 MG/DL (ref 0.57–1)
EGFRCR SERPLBLD CKD-EPI 2021: 63.8 ML/MIN/1.73
ERYTHROCYTE [DISTWIDTH] IN BLOOD BY AUTOMATED COUNT: 13.1 % (ref 12.3–15.4)
FERRITIN SERPL-MCNC: ABNORMAL NG/ML (ref 13–150)
GLOBULIN SER CALC-MCNC: 2.8 GM/DL
GLUCOSE SERPL-MCNC: 151 MG/DL (ref 65–99)
HBA1C MFR BLD: 6.3 % (ref 4.8–5.6)
HCT VFR BLD AUTO: 42.5 % (ref 34–46.6)
HGB BLD-MCNC: 15.1 G/DL (ref 12–15.9)
MCH RBC QN AUTO: 38.4 PG (ref 26.6–33)
MCHC RBC AUTO-ENTMCNC: 35.5 G/DL (ref 31.5–35.7)
MCV RBC AUTO: 108.1 FL (ref 79–97)
PLATELET # BLD AUTO: 336 10*3/MM3 (ref 140–450)
POTASSIUM SERPL-SCNC: 4.9 MMOL/L (ref 3.5–5.2)
PROT SERPL-MCNC: 6.8 G/DL (ref 6–8.5)
RBC # BLD AUTO: 3.93 10*6/MM3 (ref 3.77–5.28)
SODIUM SERPL-SCNC: 135 MMOL/L (ref 136–145)
VIT B12 SERPL-MCNC: 432 PG/ML (ref 211–946)
WBC # BLD AUTO: 6.3 10*3/MM3 (ref 3.4–10.8)

## 2024-02-06 ENCOUNTER — TELEPHONE (OUTPATIENT)
Dept: INTERNAL MEDICINE | Facility: CLINIC | Age: 69
End: 2024-02-06
Payer: COMMERCIAL

## 2024-02-08 NOTE — ASSESSMENT & PLAN NOTE
Doesn't seem like she is having hypoglycemic event - more related to alcohol.     Will recheck A1C today and modify treatment if needed.

## 2024-02-08 NOTE — ASSESSMENT & PLAN NOTE
I agree that she needs an intensive inpatient recovery program.   They will contact program and let me know if they need assistance.     Discussed effects on liver and long term health.

## 2024-02-09 ENCOUNTER — TELEPHONE (OUTPATIENT)
Dept: INTERNAL MEDICINE | Facility: CLINIC | Age: 69
End: 2024-02-09
Payer: COMMERCIAL

## 2024-02-12 ENCOUNTER — TELEPHONE (OUTPATIENT)
Dept: INTERNAL MEDICINE | Facility: CLINIC | Age: 69
End: 2024-02-12
Payer: COMMERCIAL

## 2024-02-12 NOTE — PROGRESS NOTES
Noted:     Patient declined referral to GI for elevated LFT      Patient also had head CT scan ordered for yesterday.  No showed.   Now scheduled for 3/2/24    Requested CGM - no indicated based on labs and current treatment.     Lab Results       Component                Value               Date                       HGBA1C                   6.30 (H)            02/02/2024

## 2024-02-15 ENCOUNTER — TELEPHONE (OUTPATIENT)
Dept: INTERNAL MEDICINE | Facility: CLINIC | Age: 69
End: 2024-02-15
Payer: COMMERCIAL

## 2024-02-15 DIAGNOSIS — R74.8 ELEVATED LIVER ENZYMES: Primary | ICD-10-CM

## 2024-02-16 ENCOUNTER — TELEPHONE (OUTPATIENT)
Dept: INTERNAL MEDICINE | Facility: CLINIC | Age: 69
End: 2024-02-16
Payer: COMMERCIAL

## 2024-02-16 NOTE — TELEPHONE ENCOUNTER
Caller: ELENA MTZ    Best call back number: [phone]     PATIENT WAS ADMITTED [facility] AND SISTER IN LAW IS REQUESTING A CALL TO SEE IF SHE IS ON ANY BLOOD PRESSURE MEDICATION AND WHAT HER BLOOD PRESSURE GENERALLY IS.

## 2024-02-16 NOTE — TELEPHONE ENCOUNTER
Left Voice Mail,     Patient takes Losartan 25 mg for her bp, blood pressure is stable in the 120s.     HUB OKAY TO READ

## 2024-02-19 NOTE — TELEPHONE ENCOUNTER
Sister advised    Sister states patient is going to be in rehab for another month. Advised to call us to schedule follow-up with Mauricio when she is released

## 2024-03-02 ENCOUNTER — HOSPITAL ENCOUNTER (OUTPATIENT)
Facility: HOSPITAL | Age: 69
Discharge: HOME | End: 2024-03-02
Admitting: NURSE PRACTITIONER
Payer: COMMERCIAL

## 2024-03-02 DIAGNOSIS — Q67.0 ASYMMETRY OF FACE: ICD-10-CM

## 2024-03-02 PROCEDURE — 70450 CT HEAD/BRAIN W/O DYE: CPT

## 2024-03-04 ENCOUNTER — TELEPHONE (OUTPATIENT)
Dept: INTERNAL MEDICINE | Facility: CLINIC | Age: 69
End: 2024-03-04
Payer: COMMERCIAL

## 2024-03-04 NOTE — TELEPHONE ENCOUNTER
Caller: [facility] call back number: [phone]    What medication are you requesting: NALTREXONE    Additional notes:ASKING IF BILL SENIOR CAN PRESCRIBE THIS MEDICATION?  PLEASE ADVISE

## 2024-03-05 NOTE — TELEPHONE ENCOUNTER
We do not prescribe naltrexone. She would need to follow up with addiction specialist after she is discharged.   They should be able to refer her to someone if they don't have someone to follow her and prescribe on an ongoing basis.     JUAN CARLOS

## 2024-03-05 NOTE — TELEPHONE ENCOUNTER
Spoke to cassi and advised we don't prescribe medication and patient would need to be referred to someone.

## 2024-03-11 ENCOUNTER — TELEPHONE (OUTPATIENT)
Dept: INTERNAL MEDICINE | Facility: CLINIC | Age: 69
End: 2024-03-11

## 2024-03-11 NOTE — TELEPHONE ENCOUNTER
Hub staff attempted to follow warm transfer process and was unsuccessful     Caller: ELENA    Relationship to patient:     Best call back number: [phone]     Patient is needing: STATUS ON REFERRAL TO GI

## 2024-03-11 NOTE — TELEPHONE ENCOUNTER
Left Voice Mail, patient can contact the office to schedule @[phone] to make an appointment. HUB OKAY TO READ

## 2024-03-20 ENCOUNTER — APPOINTMENT (OUTPATIENT)
Dept: WOMENS IMAGING | Facility: HOSPITAL | Age: 69
End: 2024-03-20
Payer: COMMERCIAL

## 2024-03-20 PROCEDURE — 77066 DX MAMMO INCL CAD BI: CPT | Performed by: RADIOLOGY

## 2024-03-20 PROCEDURE — 76642 ULTRASOUND BREAST LIMITED: CPT | Performed by: RADIOLOGY

## 2024-03-20 PROCEDURE — 77062 BREAST TOMOSYNTHESIS BI: CPT | Performed by: RADIOLOGY

## 2024-03-20 PROCEDURE — G0279 TOMOSYNTHESIS, MAMMO: HCPCS | Performed by: RADIOLOGY

## 2024-03-29 ENCOUNTER — TELEPHONE (OUTPATIENT)
Dept: INTERNAL MEDICINE | Facility: CLINIC | Age: 69
End: 2024-03-29
Payer: COMMERCIAL

## 2024-03-29 NOTE — TELEPHONE ENCOUNTER
Caller: Emilia Squires    Relationship: Self    Best call back number: [phone]     What was the call regarding: PATIENT STATES THAT BILL NADEEN SAID HE COULD NOT PRESCRIBE NALTREXONE 50 MG TABLET.     IS THERE ANOTHER PROVIDER IN THE OFFICE THAT CAN? PLEASE ADVISE.

## 2024-03-29 NOTE — TELEPHONE ENCOUNTER
Advised pt to call [facility] and see if they will refer patient to an addiction specialist.    Advised we don't prescribe medication in office.     Patient advised

## 2024-04-03 ENCOUNTER — LAB REQUISITION (OUTPATIENT)
Dept: LAB | Facility: HOSPITAL | Age: 69
End: 2024-04-03
Payer: COMMERCIAL

## 2024-04-03 ENCOUNTER — APPOINTMENT (OUTPATIENT)
Dept: WOMENS IMAGING | Facility: HOSPITAL | Age: 69
End: 2024-04-03
Payer: COMMERCIAL

## 2024-04-03 DIAGNOSIS — N63.0 UNSPECIFIED LUMP IN UNSPECIFIED BREAST: ICD-10-CM

## 2024-04-03 PROCEDURE — 19083 BX BREAST 1ST LESION US IMAG: CPT | Performed by: RADIOLOGY

## 2024-04-03 PROCEDURE — A4648 IMPLANTABLE TISSUE MARKER: HCPCS | Performed by: RADIOLOGY

## 2024-04-03 PROCEDURE — 88305 TISSUE EXAM BY PATHOLOGIST: CPT | Performed by: OBSTETRICS & GYNECOLOGY

## 2024-04-04 LAB
DX PRELIMINARY: NORMAL
LAB AP CASE REPORT: NORMAL
LAB AP CLINICAL INFORMATION: NORMAL
PATH REPORT.FINAL DX SPEC: NORMAL
PATH REPORT.GROSS SPEC: NORMAL

## 2024-05-01 ENCOUNTER — TELEPHONE (OUTPATIENT)
Dept: SURGERY | Facility: CLINIC | Age: 69
End: 2024-05-01
Payer: COMMERCIAL

## 2024-05-02 ENCOUNTER — TELEPHONE (OUTPATIENT)
Dept: SURGERY | Facility: CLINIC | Age: 69
End: 2024-05-02
Payer: COMMERCIAL

## 2024-05-03 ENCOUNTER — TELEPHONE (OUTPATIENT)
Dept: SURGERY | Facility: CLINIC | Age: 69
End: 2024-05-03
Payer: COMMERCIAL

## 2024-05-06 ENCOUNTER — PREP FOR SURGERY (OUTPATIENT)
Dept: OTHER | Facility: HOSPITAL | Age: 69
End: 2024-05-06

## 2024-05-06 ENCOUNTER — TELEPHONE (OUTPATIENT)
Dept: SURGERY | Facility: CLINIC | Age: 69
End: 2024-05-06
Payer: COMMERCIAL

## 2024-05-06 DIAGNOSIS — R92.8 ABNORMAL ULTRASOUND OF BREAST: Primary | ICD-10-CM

## 2024-05-20 ENCOUNTER — TELEPHONE (OUTPATIENT)
Dept: SURGERY | Facility: CLINIC | Age: 69
End: 2024-05-20
Payer: COMMERCIAL

## 2024-05-20 NOTE — TELEPHONE ENCOUNTER
Left message for pt to call back to schedule her stereotactic biopsy.  [facility] has been working with this pt and Dr Raghu Sinclair office to get this biopsy scheduled.   Pt is refusing the stereotactic biopsy with [facility] because she needs and upright biopsy. [facility] has an upright machine, and I would be happy to schedule this for her.   I called Dr Sinclair office and [facility] to let them know that we have not heard back from the pt yet.  She does have an appointment scheduled but unless we get the pathology from her Right breast stereo, we will not be able to offer a surgical plan at that visit. We may have to reschedule that appointment with Dr Mendez.     CMA

## 2024-05-21 ENCOUNTER — TELEPHONE (OUTPATIENT)
Dept: SURGERY | Facility: CLINIC | Age: 69
End: 2024-05-21
Payer: COMMERCIAL

## 2024-05-21 NOTE — TELEPHONE ENCOUNTER
Pt notified that her appointment for 5/30/2024 has been moved to 6/19/2024 at 10:15 due to the need for her biopsy prior to her appointment.  [facility] has the stereotactic biopsy scheduled on 6/14/2024 pt to arrive at 11:30.  Pt verbalized understanding of appt time, dates and locations.     CMA

## 2024-06-06 DIAGNOSIS — E11.65 TYPE 2 DIABETES MELLITUS WITH HYPERGLYCEMIA, WITHOUT LONG-TERM CURRENT USE OF INSULIN: Chronic | ICD-10-CM

## 2024-06-06 NOTE — TELEPHONE ENCOUNTER
Rx Refill Note  Requested Prescriptions     Pending Prescriptions Disp Refills    glipizide (GLUCOTROL) 5 MG tablet [Pharmacy Med Name: GLIPIZIDE 5MG TABLETS] 180 tablet 0     Sig: TAKE 1 TABLET BY MOUTH TWICE DAILY BEFORE MEALS      Last office visit with prescribing clinician: 2/2/2024  Next office visit with prescribing clinician: 6/7/2024         Rosalind Shirley MA  06/06/24, 09:48 EDT

## 2024-06-10 ENCOUNTER — TELEPHONE (OUTPATIENT)
Dept: SURGERY | Facility: CLINIC | Age: 69
End: 2024-06-10
Payer: COMMERCIAL

## 2024-06-10 DIAGNOSIS — E11.65 TYPE 2 DIABETES MELLITUS WITH HYPERGLYCEMIA, WITHOUT LONG-TERM CURRENT USE OF INSULIN: Chronic | ICD-10-CM

## 2024-06-10 RX ORDER — GLIPIZIDE 5 MG/1
5 TABLET ORAL
Qty: 60 TABLET | Refills: 0 | Status: SHIPPED | OUTPATIENT
Start: 2024-06-10 | End: 2024-06-14 | Stop reason: SDUPTHER

## 2024-06-10 NOTE — TELEPHONE ENCOUNTER
Called the PT to reschedule her appt with Dr. Mendez.    New PT appt: 06/26/2024 at 08:30 AM    PT gave a verbal understanding of appt date, time, and location.    PT appt reminder sent out 06/10/2024.    MEN

## 2024-06-11 RX ORDER — GLIPIZIDE 5 MG/1
5 TABLET ORAL
Qty: 180 TABLET | OUTPATIENT
Start: 2024-06-11

## 2024-06-11 NOTE — NURSING NOTE
Precall placed/no answer/VMM left with my name/#/reason for call/adv. arriv time 1130 @ [facility]/wear bra/okay to eat/drink prior/call if qustions.

## 2024-06-14 ENCOUNTER — PREP FOR SURGERY (OUTPATIENT)
Dept: OTHER | Facility: HOSPITAL | Age: 69
End: 2024-06-14
Payer: COMMERCIAL

## 2024-06-14 ENCOUNTER — OFFICE VISIT (OUTPATIENT)
Dept: INTERNAL MEDICINE | Facility: CLINIC | Age: 69
End: 2024-06-14
Payer: COMMERCIAL

## 2024-06-14 ENCOUNTER — HOSPITAL ENCOUNTER (OUTPATIENT)
Dept: MAMMOGRAPHY | Facility: HOSPITAL | Age: 69
Discharge: HOME | End: 2024-06-14
Payer: COMMERCIAL

## 2024-06-14 VITALS
DIASTOLIC BLOOD PRESSURE: 80 MMHG | HEIGHT: 65 IN | BODY MASS INDEX: 28.16 KG/M2 | RESPIRATION RATE: 18 BRPM | SYSTOLIC BLOOD PRESSURE: 152 MMHG | HEART RATE: 98 BPM | WEIGHT: 169 LBS | TEMPERATURE: 97.7 F | OXYGEN SATURATION: 99 %

## 2024-06-14 VITALS
BODY MASS INDEX: 28.12 KG/M2 | OXYGEN SATURATION: 99 % | WEIGHT: 169 LBS | SYSTOLIC BLOOD PRESSURE: 128 MMHG | DIASTOLIC BLOOD PRESSURE: 84 MMHG | HEART RATE: 62 BPM

## 2024-06-14 DIAGNOSIS — E55.9 VITAMIN D DEFICIENCY: ICD-10-CM

## 2024-06-14 DIAGNOSIS — F10.10 AA (ALCOHOL ABUSE): Chronic | ICD-10-CM

## 2024-06-14 DIAGNOSIS — R92.8 ABNORMAL ULTRASOUND OF BREAST: ICD-10-CM

## 2024-06-14 DIAGNOSIS — E78.2 MIXED HYPERLIPIDEMIA: ICD-10-CM

## 2024-06-14 DIAGNOSIS — I10 BENIGN ESSENTIAL HTN: Chronic | ICD-10-CM

## 2024-06-14 DIAGNOSIS — E11.65 TYPE 2 DIABETES MELLITUS WITH HYPERGLYCEMIA, WITHOUT LONG-TERM CURRENT USE OF INSULIN: Primary | Chronic | ICD-10-CM

## 2024-06-14 PROCEDURE — 99214 OFFICE O/P EST MOD 30 MIN: CPT | Performed by: NURSE PRACTITIONER

## 2024-06-14 PROCEDURE — A4648 IMPLANTABLE TISSUE MARKER: HCPCS

## 2024-06-14 PROCEDURE — 25010000002 LIDOCAINE 1 % SOLUTION: Performed by: NURSE PRACTITIONER

## 2024-06-14 RX ORDER — NALTREXONE HYDROCHLORIDE 50 MG/1
TABLET, FILM COATED ORAL
COMMUNITY
Start: 2024-03-17

## 2024-06-14 RX ORDER — TRAZODONE HYDROCHLORIDE 100 MG/1
TABLET ORAL
COMMUNITY
Start: 2024-02-23

## 2024-06-14 RX ORDER — GLIPIZIDE 5 MG/1
5 TABLET ORAL
Qty: 180 TABLET | Refills: 1 | Status: SHIPPED | OUTPATIENT
Start: 2024-06-14

## 2024-06-14 RX ORDER — FOLIC ACID 1 MG/1
TABLET ORAL
COMMUNITY
Start: 2024-02-16

## 2024-06-14 RX ORDER — PROGESTERONE 200 MG/1
200 CAPSULE ORAL NIGHTLY
COMMUNITY
Start: 2024-04-02

## 2024-06-14 RX ORDER — LIDOCAINE HYDROCHLORIDE 10 MG/ML
2 INJECTION, SOLUTION INFILTRATION; PERINEURAL ONCE
Status: COMPLETED | OUTPATIENT
Start: 2024-06-14 | End: 2024-06-14

## 2024-06-14 RX ADMIN — Medication 2 ML: at 13:42

## 2024-06-14 RX ADMIN — LIDOCAINE HYDROCHLORIDE 17 ML: 10; .005 INJECTION, SOLUTION EPIDURAL; INFILTRATION; INTRACAUDAL; PERINEURAL at 13:43

## 2024-06-14 NOTE — ASSESSMENT & PLAN NOTE
Your last A1C (3 month average) =   Lab Results   Component Value Date    HGBA1C 6.30 (H) 02/02/2024      Your diabetes is Controlled.    The American Diabetes Association recommends an A1C of less than 7%.  A1C Average Levels Blood Sugar:   6%  126 mg/dL  7%  154 mg/dL  8%  183 mg/dL  9%  212 mg/dL  10%  240 mg/dL  11%  269 mg/dL  12%  298 mg/dL    Glucose goals for many adults with diabetes  Blood sugar before meals  mg/dL  Blood sugar 1-2 hours after the start of a meal Less than 180 mg/dL A1C Less than 7%    Eye Health:   You need a diabetic eye exam yearly.   Please have a copy of the note faxed to my office.   Fax: [phone]    Foot Health:   You need a diabetic foot exam yearly.   Check your feet routinely for any wounds.   You should always check your shoes for any debris that could cause a wound.

## 2024-06-14 NOTE — NURSING NOTE
Biopsy sites x 2 to right breast (one site to right upper mid breast and the other site to the right outer breast) clear with Dermabond dry and intact to both sites. There is some light bluish green bruising present above the skin nick to the upper mid site. No firmness or swelling noted at or around either biopsy site. Denies pain. Ice pack with protective covering applied to biopsy sites. Discharge instructions discussed with understanding voiced by patient. Copies provided to patient. No distress noted. To home via private vehicle.

## 2024-06-14 NOTE — NURSING NOTE
In discussion with patient prior to consenting, it was determined that this should be on the right breast and not the left as ordered. Call placed to Dr. Mendez's office. Spoke with Merlin who advised they would put the order in.

## 2024-06-14 NOTE — PROGRESS NOTES
Chief Complaint  No chief complaint on file.     Subjective:      History of Present Illness {CC  Problem List  Visit  Diagnosis   Encounters  Notes  Medications  Labs  Result Review Imaging  Media :23}     Emilia Squires presents to [facility] for:  follow up chronic conditions    Hypertension: chronic: continues losartan  No CP, SOA   States BP has been controlled but elevated today: anxious about breast bx     Diabetes: chronic and continues glipizide   RF: wine   last A1C 6.3%     Elevated LFT at last visit: during heavy alcohol abuse.   No abd pain. No change in stool colour.     GYN: Ac  Plans for breast bx today: right (Couch, S)   States on progesterone to shed uterine lining.   States plans on DEXA there.     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  [place].     Now retired. Working on house to prep for sell - plan to downsize.     Alcohol abuse: she has been in rehab several times. Recent in the Spring.     States still up to 4 glasses of wine a day on some days.   Has not been to AA meetings since she has been back. States she will restart. Declines further help.     Rehab: gave her trazodone.   Advised against as she is also drinking.   I will not be refilling.     Colon cancer screen: states she will schedule     I have reviewed patient's medical history, any new submitted information provided by patient or medical assistant and updated medical record.      Objective:      Physical Exam  Constitutional:       Appearance: Normal appearance.   Cardiovascular:      Rate and Rhythm: Normal rate and regular rhythm.      Pulses: Normal pulses.      Heart sounds: Normal heart sounds.   Pulmonary:      Effort: Pulmonary effort is normal.      Breath sounds: Normal breath sounds.   Feet:      Comments: Diabetic Foot Exam Performed and Monofilament Test Performed     Neurological:      Mental Status: She is alert and  "oriented to person, place, and time.        Result Review  Data Reviewed:{ Labs  Result Review  Imaging  Med Tab  Media :23}     The following data was reviewed by: Evgeny Andrew III, RASHI-SHIVAM on 06/14/2024  Common labs          7/3/2023    11:05 2/2/2024    15:39   Common Labs   Glucose 92  151    BUN 11  11    Creatinine 0.79  0.97    Sodium 141  135    Potassium 5.2  4.9    Chloride 104  96    Calcium 10.0  9.4    Total Protein 7.6  6.8    Albumin 4.5  4.0    Total Bilirubin 0.5  0.8    Alkaline Phosphatase 93  115    AST (SGOT) 104  124    ALT (SGPT) 90  126    WBC  6.30    Hemoglobin  15.1    Hematocrit  42.5    Platelets  336    Hemoglobin A1C  6.30             Vital Signs:   /84 (BP Location: Left arm, Patient Position: Sitting, Cuff Size: Adult)   Pulse 62   Wt 76.7 kg (169 lb)   SpO2 99%   BMI 28.12 kg/m²   Estimated body mass index is 28.12 kg/m² as calculated from the following:    Height as of 2/2/24: 165.1 cm (65\").    Weight as of this encounter: 76.7 kg (169 lb).        Requested Prescriptions      No prescriptions requested or ordered in this encounter       Routine medications provided by this office will also be refilled via pharmacy request.       Current Outpatient Medications:     Blood Glucose Monitoring Suppl (OneTouch Verio Flex System) w/Device kit, Use to test blood glucose up to four times daily as needed., Disp: 1 kit, Rfl: 0    Cholecalciferol (VITAMIN D3) 5000 UNITS capsule capsule, Take  by mouth., Disp: , Rfl:     folic acid (FOLVITE) 1 MG tablet, , Disp: , Rfl:     glipizide (GLUCOTROL) 5 MG tablet, TAKE 1 TABLET BY MOUTH TWICE DAILY BEFORE MEALS, Disp: 60 tablet, Rfl: 0    glucose blood test strip, Use to test blood glucose up to four times daily as needed., Disp: 100 each, Rfl: 0    Lancets misc, Use to test blood glucose up to four times daily as needed., Disp: 100 each, Rfl: 0    losartan (COZAAR) 25 MG tablet, Take 1 tablet by mouth Daily., Disp: 90 " tablet, Rfl: 1    Multiple Vitamin (MULTI VITAMIN DAILY PO), Take  by mouth., Disp: , Rfl:     naltrexone (DEPADE) 50 MG tablet, [[id number]]: NALTREXONE, 50 MG X 1 TABLET(S) , ORAL, TABLET, ONCE A DAY IN THE MORNING, FOR 30 DAYS, Disp: , Rfl:     Progesterone (PROMETRIUM) 200 MG capsule, Take 1 capsule by mouth Every Night., Disp: , Rfl:     traZODone (DESYREL) 100 MG tablet, , Disp: , Rfl:     Triprolidine-Pseudoephedrine (ACTIFED PO), Take  by mouth daily., Disp: , Rfl:     propranolol (INDERAL) 40 MG tablet, TAKE 1 TABLET BY MOUTH TWICE DAILY (Patient not taking: Reported on 6/14/2024), Disp: 60 tablet, Rfl: 1     Assessment and Plan:      Assessment and Plan {CC Problem List  Visit Diagnosis  ROS  Review (Popup)  Health Maintenance  Quality  BestPractice  Medications  SmartSets  SnapShot Encounters  Media :23}     There are no diagnoses linked to this encounter.         No orders of the defined types were placed in this encounter.          Follow Up {Instructions Charge Capture  Follow-up Communications :23}     No follow-ups on file.      Patient was given instructions and counseling regarding her condition or for health maintenance advice. Please see specific information pulled into the AVS if appropriate.    Dragon disclaimer:   Much of this encounter note is an electronic transcription/translation of spoken language to printed text. The electronic translation of spoken language may permit erroneous, or at times, nonsensical words or phrases to be inadvertently transcribed; Although I have reviewed the note for such errors, some may still exist.     Additional Patient Counseling:       There are no Patient Instructions on file for this visit.

## 2024-06-14 NOTE — NURSING NOTE
Dr. Parr spoke with Dr. Quezada from [facility] regarding clarification of whether this should be stereotactic biopsy of 1 vs. 2 sites in the right breast. Dr. Quezada confirmed it should be 2 sites. Call placed to Merlin again in Dr. Mendez's office with arianne advised that it is recommended by both Radiologists that 2 sites be biopsied, so we will need a second site order for the right breast as well. She stted they would get the orders in. They are aware that the patient is currently here and we are awaiting orders before we can begin. Patient is aware of same.

## 2024-06-14 NOTE — PATIENT INSTRUCTIONS
Summer Health Information:     Stay hydrated when outside  If your urine starts to get darker, you are not drinking enough     Protect yourself from ticks and mosquitos  Here are the best ingredients to look for:  DEET (N,N-diethyl-m-toluamide or N,N-diethyl-3-methyl-benzamide)  Picaridin  Oil of lemon eucalyptus (p-menthane-3,8-diol or PMD)  Wear light-colored pants so you can spot ticks easier  If you use a permethrin product, ONLY apply to clothing    Practice Safe Sun!    Use sun screen SPF >30 daily, reapply regularly per directions on package  Apply 15 mins before going outside  If swimming, re-apply every 45 mins  See dermatologist for skin check regularly  Protect your eyes with sunglasses with UV protection    Poison Ivy  If you are going into areas that may have poison ivy, prepare with a product like IvyX or other ivy blocker.  Wash your clothes and pets after being in an area with ivy when returning home. If you come in contact with poison ivy, try a product like Technu     Other things you should incorporate all year...     Diet:    Eat vegetables, fruits, whole grain, low-fat dairy, poultry, fish, beans, nontropical vegetable oils, and nuts, but avoid red meat (i.e., Mediterranean-style diet, DASH [Dietary Approaches to Stop Hypertension] diet).  Limit sugary drinks and sweets.  Limit saturated and trans fat to 5% to 6% of calories.  Limit sodium intake to 2,400 mg daily (about one teaspoon table salt [kosher/sea salt have less sodium per teaspoon]).  https://www.eatright.org/    Weight loss / Calorie Counting Apps:    Lose It!   Erenis Pal   Works great when you try it with a partner/ friend. It takes about 15 minutes a day but studies show that this simple method of monitoring your intake can help you achieve goals as it keeps you accountable.  I often will ask patients to try these apps just to get an idea of how much sodium and how many carbohydrates you are taking in.   Exercise:   Engage in  moderate-to-vigorous aerobic activity for at least 40 minutes (on average) three to four times each week.  Wearables:   Activity tracker   Step tracker: getting 7,500 steps daily can cut your cardiac risks by 44%   Bone Health:   Https://www.nof.org/patients/treatment/nutrition/  Routine weight bearing exercise

## 2024-06-14 NOTE — ASSESSMENT & PLAN NOTE
Hypertension is unchanged.  Continue current treatment regimen.  Dietary sodium restriction.  Regular aerobic exercise.  Blood pressure will be reassessed at the next regular appointment.  A little higher today: she will continue to monitor and let me know if SBP consistently over 130.

## 2024-06-15 ENCOUNTER — TELEPHONE (OUTPATIENT)
Dept: RADIOLOGY | Facility: HOSPITAL | Age: 69
End: 2024-06-15
Payer: COMMERCIAL

## 2024-06-17 LAB
LAB AP CASE REPORT: NORMAL
LAB AP DIAGNOSIS COMMENT: NORMAL
LAB AP SPECIAL STAINS: NORMAL
PATH REPORT.FINAL DX SPEC: NORMAL
PATH REPORT.GROSS SPEC: NORMAL

## 2024-06-18 ENCOUNTER — TELEPHONE (OUTPATIENT)
Dept: SURGERY | Facility: CLINIC | Age: 69
End: 2024-06-18
Payer: COMMERCIAL

## 2024-06-18 DIAGNOSIS — D05.01 BREAST NEOPLASM, TIS (LCIS), RIGHT: Primary | ICD-10-CM

## 2024-06-18 NOTE — TELEPHONE ENCOUNTER
Spoke to Dr Mendez about pt she wants to get breast MRI before she sees pt in office   I was able to get [place] at [facility]    I will have manpreet get the pre certification for this.

## 2024-06-18 NOTE — TELEPHONE ENCOUNTER
I called Ms. Squires with the following appts.    MRI: 07/05/2024   Arrive: 10:15 AM  Scan: 10:45 AM    New PT appt with : 07/15/2024 at 11:15 AM    PT gave a verbal understanding of appt dates, times, and locations.    APPT reminders sent out on 06/18/2024.    MEN

## 2024-07-05 ENCOUNTER — HOSPITAL ENCOUNTER (OUTPATIENT)
Dept: MRI IMAGING | Facility: HOSPITAL | Age: 69
Discharge: HOME | End: 2024-07-05
Payer: COMMERCIAL

## 2024-07-05 DIAGNOSIS — D05.01 BREAST NEOPLASM, TIS (LCIS), RIGHT: ICD-10-CM

## 2024-07-15 ENCOUNTER — PREP FOR SURGERY (OUTPATIENT)
Dept: OTHER | Facility: HOSPITAL | Age: 69
End: 2024-07-15

## 2024-07-15 ENCOUNTER — OFFICE VISIT (OUTPATIENT)
Dept: SURGERY | Facility: CLINIC | Age: 69
End: 2024-07-15
Payer: COMMERCIAL

## 2024-07-15 VITALS
DIASTOLIC BLOOD PRESSURE: 84 MMHG | WEIGHT: 169 LBS | RESPIRATION RATE: 16 BRPM | HEIGHT: 65 IN | SYSTOLIC BLOOD PRESSURE: 148 MMHG | OXYGEN SATURATION: 98 % | HEART RATE: 110 BPM | BODY MASS INDEX: 28.16 KG/M2

## 2024-07-15 DIAGNOSIS — D05.01 BREAST NEOPLASM, TIS (LCIS), RIGHT: Primary | ICD-10-CM

## 2024-07-15 DIAGNOSIS — F10.10 AA (ALCOHOL ABUSE): Chronic | ICD-10-CM

## 2024-07-15 RX ORDER — CLINDAMYCIN PHOSPHATE 900 MG/50ML
900 INJECTION, SOLUTION INTRAVENOUS ONCE
Status: CANCELLED | OUTPATIENT
Start: 2024-08-12 | End: 2024-07-15

## 2024-07-15 NOTE — LETTER
"July 21, 2024       No Recipients    Patient: Emilia Squires   YOB: 1955   Date of Visit: 7/15/2024     Dear Evgeny Andrew III, NP-C:       Thank you for referring Emilia Squires to me for evaluation. Below are the relevant portions of my assessment and plan of care.    If you have questions, please do not hesitate to call me. I look forward to following Emilia along with you.         Sincerely,        Jocelyn Mendez MD        CC:   No Recipients    Jocelyn Mendez MD  07/21/24 2203  Sign when Signing Visit  [facility]     Referring Provider: Lexie Shen     Chief complaint:  newly diagnosed LCIS     Subjective   HPI: Ms. Emilia Squires is a 69 y.o. yo woman, seen at the request of Lexie Shen , for a new diagnosis of LCIS.      This was initially detected as an imaging abnormality on routine screening. She had a long absence in her mammogram history - states her last mammogram prior to this round of imaging was at the \"[facility]\" which [facility] be the [facility] - which has been [place] for over 20 years. She said she completed this mammogram because it was time to take care of herself.     She follows with her PCP Mauricio Pimentel NP who has well documented history of alcohol abuse, concern from family members and impact on drinking on her job performance. She did not disclose specific alcohol abuse or misuse but reported 2-4 glasses of wine.    She was instructed to start taking progesterone to thin the lining of her uterus but she only took 1 pill. Unable to recall reason for short periord of attempted therapy.    She denies any breast lumps, pain, skin changes, or nipple discharge.She denies any prior history of abnormal mammograms or breast biopsies.     She reports a pertinent PMH significant for DM, depression, .     She denies any family history of breast or ovarian cancer.     She was by herself in clinic today.       Review of " Systems   Constitutional: Negative.    HENT:  Negative.     Eyes: Negative.    Respiratory: Negative.     Cardiovascular: Negative.    Gastrointestinal: Negative.    Endocrine: Negative.    Genitourinary: Negative.     Musculoskeletal: Negative.    Skin: Negative.    Neurological: Negative.    Hematological: Negative.    Psychiatric/Behavioral: Negative.         Medications:    Current Outpatient Medications:   •  Blood Glucose Monitoring Suppl (OneTouch Verio Flex System) w/Device kit, Use to test blood glucose up to four times daily as needed., Disp: 1 kit, Rfl: 0  •  Cholecalciferol (VITAMIN D3) 5000 UNITS capsule capsule, Take  by mouth., Disp: , Rfl:   •  folic acid (FOLVITE) 1 MG tablet, , Disp: , Rfl:   •  glipizide (GLUCOTROL) 5 MG tablet, Take 1 tablet by mouth 2 (Two) Times a Day Before Meals., Disp: 180 tablet, Rfl: 1  •  glucose blood test strip, Use to test blood glucose up to four times daily as needed., Disp: 100 each, Rfl: 0  •  Lancets misc, Use to test blood glucose up to four times daily as needed., Disp: 100 each, Rfl: 0  •  losartan (COZAAR) 25 MG tablet, Take 1 tablet by mouth Daily., Disp: 90 tablet, Rfl: 1  •  Multiple Vitamin (MULTI VITAMIN DAILY PO), Take  by mouth., Disp: , Rfl:   •  naltrexone (DEPADE) 50 MG tablet, [[id number]]: NALTREXONE, 50 MG X 1 TABLET(S) , ORAL, TABLET, ONCE A DAY IN THE MORNING, FOR 30 DAYS, Disp: , Rfl:   •  Progesterone (PROMETRIUM) 200 MG capsule, Take 1 capsule by mouth Every Night., Disp: , Rfl:   •  traZODone (DESYREL) 100 MG tablet, , Disp: , Rfl:     Allergies:  Allergies   Allergen Reactions   • Penicillins Swelling     THROAT SWELLS CLOSED       Medical history:  Past Medical History:   Diagnosis Date   • Acquired coagulation factor deficiency    • Alcohol abuse    • Depression    • Diabetes mellitus    • Diverticulitis    • Hypertension        Surgical History:  Past Surgical History:   Procedure Laterality Date   • APPENDECTOMY     • BREAST BIOPSY Left   "  • ILEOSTOMY CLOSURE         Family History:  Family History   Problem Relation Age of Onset   • Ovarian cancer Mother    • Diabetes Father    • Colon cancer Father    • Diabetes Brother    • No Known Problems Daughter        Cancer Family History: Age of diagnosis/Age at death OR Age as of today  Breast Cancer: N/A  Ovarian Cancer: N/A  Pancreatic Cancer: N/A  Prostate Cancer: N/A  Colon Cancer: Father Dx (age 78) past (age 85)  Lung Cancer: N/A    Social History:   Social History     Socioeconomic History   • Marital status:    Tobacco Use   • Smoking status: Never   • Smokeless tobacco: Never   Vaping Use   • Vaping status: Never Used   Substance and Sexual Activity   • Alcohol use: Yes     Alcohol/week: 2.0 - 3.0 standard drinks of alcohol     Types: 2 - 3 Glasses of wine per week     Comment: daily wine   • Drug use: Never   • Sexual activity: Defer       She lives with her .  She is retired.       GYNECOLOGIC HISTORY:   . P: 1. AB: 0.  Last menstrual period: 55 years old  Age at menarche: 14  Age at first childbirth: 42  Lactation: 3 months   Age at menopause: 55  Total years of oral contraceptive use: 0  Total years of hormone replacement therapy: 0      Objective   PHYSICAL EXAMINATION  This exam was chaperoned by: Shanthi Victor   /84   Pulse 110   Resp 16   Ht 165.1 cm (65\")   Wt 76.7 kg (169 lb)   SpO2 98%   BMI 28.12 kg/m²   ECOG 0 - Asymptomatic  General: NAD, ill fitted glasses, not always able to answer direct questo  Psych: a&o x 3, normal mood and affect  Eyes: EOMI, no scleral icterus  ENMT: neck supple without masses or thyromegaly, mucus membranes moist  Resp: normal effort  CV: RRR, no edema   GI: soft, NT, ND  MSK: normal gait, normal ROM in bilateral shoulders  Lymph nodes:  no cervical, supraclavicular or axillary lymphadenopathy  Breast: symmetric, bra 34 D, large breast width.   Right:  No visible abnormalities on inspection while seated, with arms raised or hands " on hips. No masses, skin changes, or nipple abnormalities.  Left:  No visible abnormalities on inspection while seated, with arms raised or hands on hips.No masses, skin changes, or nipple abnormalities.       Imaging - documented images below have been personally reviewed:  3/1/24 bilateral screening mammogram ([facility])  Scattered areas of fibroglandular density.  Finding 1: there is a focal asymmetry measuring 14 mm with associated architectural distoriton seen in the middle one thired region of the right breast at 11:00 located 7 cm fn.   Finding 2: calcification with grouped distribution int he middle one third of the right brast as 12 oclock, 8 cmfn  Impression:   Finding 1 - focal asymmetry requires additional evaluation  Finding 2 - calcifications in the right breast require additional evaluation.   Finding 3 - mass in the left breast requires additional evaluation  [id number]    3/20/24 bilateral mammogram  Scattered areas of fibroglandular density.  Finding 1: there is a coal asymmetry measuring 14 mm with associated architectural distortion in the middle one third of the right breast 11:00 5 cmfn. Finding persissists.   Finding 2 - additional evaluation for calcifications in the right breast 12:00 seen on 3/1/23 on present there are course heterogeneous calcifications measuring 6mm with grouped distribution in the middle 1/3 of the right breast at 12:00 8 cmfn.  Finding 3: additional evalution for the oval mass in the left breast 2:00. On present there is an oval mass measuring 11 mm. In the middle one third region of the left breast 2:00, 8 cmfn.  Breast US  Finding 1 - two irrevular non-parallel hypoechoic avascular masses with indisticnt margins largest of which is 7mm in the middle one third  region of the right breast 11:00, 5 cmfn. There are decrased posterior echoes, edge shadows are excluded. Adjacent  masses are approximatly 8 mm apart and span 18mm.   Finding 3 - oval parallel hypoechoic mass with  circumscribed mass measures 12 x 5 x 9 mm in the middle one thirdregion of the left breast at 2:00 8 cmfn.  Visualsed bilateral axillary lymph nodes are normal.   IMPRESSION  Finding 1 - mass in the right breast are susupcious - recommend biopsy. Confirm ultrasound biopsy samples abnormal mammogram findings  Finding 2 - cacifications are suspicious in the right breast - recommend biopsy  Finding 3 - mass in the left breast is suspicious - biopsy is recommended.     Biopsies:  4/3/24 Left US Biopsy  Left breast 2:00 was prepped, using a 12 gauge biopsy device, 4 cores were obtained. A [place] shaped tissue marker was placed. 2 view mammogram showed clip in the expected location - pathology returned as fibroadenoma. Pathology is benign and concordant.   6/14/24 Right Stereotactic biopsies  Site1 - architectular distortion in the upper outer right breast was loclaied, using an 8 gauge needle, 6 cores were obtained. Specimen radiograph showed a mixed and striated denisty tissue confirming correct sampling. A bowtie clip was placed. Pathology showed LCIS. Pathology is concordant and high risk. Two view mammogram showed clip in the expected location with a 3.8 cm post biopsy hematoma. Additional distorition is identified and mared on imaging, aproximatly 0.8 cm lateral to the biopsy site        Pathology:  6/14/24  1.  Right breast, 10:00 (site A), stereotactic biopsies for architectural distortion (bowtie clip): LOBULAR CARCINOMA IN SITU (LCIS) INVOLVING ADENOSIS.               -Microcalcifications present associated with adenosis and LCIS.               -No evidence of invasive carcinoma identified.     2.  Right breast, 12:00 (site B), stereotactic biopsies for calcifications (triple twist clip):               -Fibroadenomatoid hyperplasia with associated stromal calcifications.    Assessment & Plan   Assessment:  69 y.o. F with a new diagnosis of Right Breast LCIS    Discussion:  The rate of cancer development in the  setting of LCIS has been reported to be approximately 2% per year, translating into a cumulative long-term rate of 26% at 15 years. While a minority of patients elect to proceed with bilateral prophylactic mastectomy for LCIS, the estimated breast cancer incidence is generally not considered high enough to justify such extensive surgery. Furthermore, women with a history of LCIS typically develop low- or intermediate-grade malignancies clinically detected at an early stage. Multiple studies support a low upgrade rate on excision (1-5% for patients diagnosed with LCIS on core biopsy and radiology-pathology concordant) with a 1% upgrade rate reported in a prospective study with central pathology review and therefore surgical excision is not routinely indicated. The risk conferred by LCIS is independent of family history. Chemoprevention reduces breast cancer risk by 40-65% in women at elevated risk, and data from the NSABP P-1 trial4 suggest that women with LCIS derive even greater benefit than this estimate.  From an imaging surveillance standpoint, patients with LCIS can opt for enhanced surveillance including annual mammogram and annual MRI.  The patient appears to understand and all questions were answered.      Plan:    - Consents completed and signed. Discussed the risks and benefits of right partial mastectomy at length including estimated time for procedure, postoperative recovery, and postoperative instructions. Discussed the risks to include pain, bleeding, infection, nerve injury, numbness, seroma, skin complications including necrosis, breast asymmetry, need for re-excision, and scar.  Patient appears to understand and wishes to proceed.  All questions were answered.  - OR for right partial mastectomy for architectural distortion/calcifications  - High risk - Imaging plan: amenable to MRI and mammograms moving forward. Will plan to at minimum obtain annual mammograms, and follow up interest in regular MRIs  as well.  Medical oncology will plan on referral following surgery. She states interest n       Jocelyn Mendez MD  Breast Surgical Oncology    I spent 70 minutes caring for Emilia on this date of service. This time includes time spent by me in the following activities: preparing for the visit, reviewing tests, obtaining and/or reviewing a separately obtained history, performing a medically appropriate examination and/or evaluation , counseling and educating the patient/family/caregiver, ordering medications, tests, or procedures, referring and communicating with other health care professionals , documenting information in the medical record, independently interpreting results and communicating that information with the patient/family/caregiver, and care coordination.      CC:  Evgeny Andrew III, MD MARI Antoine PA [facility]

## 2024-07-15 NOTE — PROGRESS NOTES
"[facility]     Referring Provider: Lexie Shen     Chief complaint:  newly diagnosed LCIS     Subjective   HPI: Ms. Emilia Squires is a 69 y.o. yo woman, seen at the request of Lexie Shen , for a new diagnosis of LCIS.      This was initially detected as an imaging abnormality on routine screening. She had a long absence in her mammogram history - states her last mammogram prior to this round of imaging was at the \"[facility]\" which would be the [facility] location [facility] - which has been [place] for over 20 years. She said she completed this mammogram because it was time to take care of herself.     She follows with her PCP Mauricio Andrew - [facility] who has well documented history of alcohol abuse, concern from family members and impact on drinking on her job performance. She did not disclose specific alcohol abuse or misuse but reported 2-4 glasses of wine.    She was instructed to start taking progesterone to thin the lining of her uterus but she only took 1 pill. Unable to recall reason for short periord of attempted therapy.    She denies any breast lumps, pain, skin changes, or nipple discharge.She denies any prior history of abnormal mammograms or breast biopsies.     She reports a pertinent PMH significant for DM, depression, .     She denies any family history of breast or ovarian cancer.     She was by herself in clinic today.       Review of Systems   Constitutional: Negative.    HENT:  Negative.     Eyes: Negative.    Respiratory: Negative.     Cardiovascular: Negative.    Gastrointestinal: Negative.    Endocrine: Negative.    Genitourinary: Negative.     Musculoskeletal: Negative.    Skin: Negative.    Neurological: Negative.    Hematological: Negative.    Psychiatric/Behavioral: Negative.         Medications:    Current Outpatient Medications:     Blood Glucose Monitoring Suppl (OneTouch Verio Flex System) w/Device kit, Use to test blood glucose up to four times daily as " needed., Disp: 1 kit, Rfl: 0    Cholecalciferol (VITAMIN D3) 5000 UNITS capsule capsule, Take  by mouth., Disp: , Rfl:     folic acid (FOLVITE) 1 MG tablet, , Disp: , Rfl:     glipizide (GLUCOTROL) 5 MG tablet, Take 1 tablet by mouth 2 (Two) Times a Day Before Meals., Disp: 180 tablet, Rfl: 1    glucose blood test strip, Use to test blood glucose up to four times daily as needed., Disp: 100 each, Rfl: 0    Lancets misc, Use to test blood glucose up to four times daily as needed., Disp: 100 each, Rfl: 0    losartan (COZAAR) 25 MG tablet, Take 1 tablet by mouth Daily., Disp: 90 tablet, Rfl: 1    Multiple Vitamin (MULTI VITAMIN DAILY PO), Take  by mouth., Disp: , Rfl:     naltrexone (DEPADE) 50 MG tablet, [[id number]]: NALTREXONE, 50 MG X 1 TABLET(S) , ORAL, TABLET, ONCE A DAY IN THE MORNING, FOR 30 DAYS, Disp: , Rfl:     Progesterone (PROMETRIUM) 200 MG capsule, Take 1 capsule by mouth Every Night., Disp: , Rfl:     traZODone (DESYREL) 100 MG tablet, , Disp: , Rfl:     Allergies:  Allergies   Allergen Reactions    Penicillins Swelling     THROAT SWELLS CLOSED       Medical history:  Past Medical History:   Diagnosis Date    Acquired coagulation factor deficiency     Alcohol abuse     Depression     Diabetes mellitus     Diverticulitis     Hypertension        Surgical History:  Past Surgical History:   Procedure Laterality Date    APPENDECTOMY      BREAST BIOPSY Left     ILEOSTOMY CLOSURE         Family History:  Family History   Problem Relation Age of Onset    Ovarian cancer Mother     Diabetes Father     Colon cancer Father     Diabetes Brother     No Known Problems Daughter        Cancer Family History: Age of diagnosis/Age at death OR Age as of today  Breast Cancer: N/A  Ovarian Cancer: N/A  Pancreatic Cancer: N/A  Prostate Cancer: N/A  Colon Cancer: Father Dx (age 78) past (age 85)  Lung Cancer: N/A    Social History:   Social History     Socioeconomic History    Marital status:    Tobacco Use    Smoking  "status: Never    Smokeless tobacco: Never   Vaping Use    Vaping status: Never Used   Substance and Sexual Activity    Alcohol use: Yes     Alcohol/week: 2.0 - 3.0 standard drinks of alcohol     Types: 2 - 3 Glasses of wine per week     Comment: daily wine    Drug use: Never    Sexual activity: Defer       She lives with her .  She is retired.       GYNECOLOGIC HISTORY:   . P: 1. AB: 0.  Last menstrual period: 55 years old  Age at menarche: 14  Age at first childbirth: 42  Lactation: 3 months   Age at menopause: 55  Total years of oral contraceptive use: 0  Total years of hormone replacement therapy: 0      Objective   PHYSICAL EXAMINATION  This exam was chaperoned by: Shanthi Victor   /84   Pulse 110   Resp 16   Ht 165.1 cm (65\")   Wt 76.7 kg (169 lb)   SpO2 98%   BMI 28.12 kg/m²   ECOG 0 - Asymptomatic  General: NAD, ill fitted glasses, not always able to answer direct questo  Psych: a&o x 3, normal mood and affect  Eyes: EOMI, no scleral icterus  ENMT: neck supple without masses or thyromegaly, mucus membranes moist  Resp: normal effort  CV: RRR, no edema   GI: soft, NT, ND  MSK: normal gait, normal ROM in bilateral shoulders  Lymph nodes:  no cervical, supraclavicular or axillary lymphadenopathy  Breast: symmetric, bra 34 D, large breast width.   Right:  No visible abnormalities on inspection while seated, with arms raised or hands on hips. No masses, skin changes, or nipple abnormalities.  Left:  No visible abnormalities on inspection while seated, with arms raised or hands on hips.No masses, skin changes, or nipple abnormalities.       Imaging - documented images below have been personally reviewed:  3/1/24 bilateral screening mammogram ([facility])  Scattered areas of fibroglandular density.  Finding 1: there is a focal asymmetry measuring 14 mm with associated architectural distoriton seen in the middle one thired region of the right breast at 11:00 located 7 cm fn.   Finding 2: calcification with " grouped distribution int he middle one third of the right brast as 12 oclock, 8 cmfn  Impression:   Finding 1 - focal asymmetry requires additional evaluation  Finding 2 - calcifications in the right breast require additional evaluation.   Finding 3 - mass in the left breast requires additional evaluation  [id number]    3/20/24 bilateral mammogram  Scattered areas of fibroglandular density.  Finding 1: there is a coal asymmetry measuring 14 mm with associated architectural distortion in the middle one third of the right breast 11:00 5 cmfn. Finding persissists.   Finding 2 - additional evaluation for calcifications in the right breast 12:00 seen on 3/1/23 on present there are course heterogeneous calcifications measuring 6mm with grouped distribution in the middle 1/3 of the right breast at 12:00 8 cmfn.  Finding 3: additional evalution for the oval mass in the left breast 2:00. On present there is an oval mass measuring 11 mm. In the middle one third region of the left breast 2:00, 8 cmfn.  Breast US  Finding 1 - two irrevular non-parallel hypoechoic avascular masses with indisticnt margins largest of which is 7mm in the middle one third  region of the right breast 11:00, 5 cmfn. There are decrased posterior echoes, edge shadows are excluded. Adjacent  masses are approximatly 8 mm apart and span 18mm.   Finding 3 - oval parallel hypoechoic mass with circumscribed mass measures 12 x 5 x 9 mm in the middle one thirdregion of the left breast at 2:00 8 cmfn.  Visualsed bilateral axillary lymph nodes are normal.   IMPRESSION  Finding 1 - mass in the right breast are susupcious - recommend biopsy. Confirm ultrasound biopsy samples abnormal mammogram findings  Finding 2 - cacifications are suspicious in the right breast - recommend biopsy  Finding 3 - mass in the left breast is suspicious - biopsy is recommended.     Biopsies:  4/3/24 Left US Biopsy  Left breast 2:00 was prepped, using a 12 gauge biopsy device, 4 cores were  obtained. A [place] shaped tissue marker was placed. 2 view mammogram showed clip in the expected location - pathology returned as fibroadenoma. Pathology is benign and concordant.   6/14/24 Right Stereotactic biopsies  Site1 - architectular distortion in the upper outer right breast was loclaied, using an 8 gauge needle, 6 cores were obtained. Specimen radiograph showed a mixed and striated denisty tissue confirming correct sampling. A bowtie clip was placed. Pathology showed LCIS. Pathology is concordant and high risk. Two view mammogram showed clip in the expected location with a 3.8 cm post biopsy hematoma. Additional distorition is identified and mared on imaging, aproximatly 0.8 cm lateral to the biopsy site        Pathology:  6/14/24  1.  Right breast, 10:00 (site A), stereotactic biopsies for architectural distortion (bowtie clip): LOBULAR CARCINOMA IN SITU (LCIS) INVOLVING ADENOSIS.               -Microcalcifications present associated with adenosis and LCIS.               -No evidence of invasive carcinoma identified.     2.  Right breast, 12:00 (site B), stereotactic biopsies for calcifications (triple twist clip):               -Fibroadenomatoid hyperplasia with associated stromal calcifications.    Assessment & Plan   Assessment:  69 y.o. F with a new diagnosis of Right Breast LCIS    Discussion:  The rate of cancer development in the setting of LCIS has been reported to be approximately 2% per year, translating into a cumulative long-term rate of 26% at 15 years. While a minority of patients elect to proceed with bilateral prophylactic mastectomy for LCIS, the estimated breast cancer incidence is generally not considered high enough to justify such extensive surgery. Furthermore, women with a history of LCIS typically develop low- or intermediate-grade malignancies clinically detected at an early stage. Multiple studies support a low upgrade rate on excision (1-5% for patients diagnosed with LCIS on  core biopsy and radiology-pathology concordant) with a 1% upgrade rate reported in a prospective study with central pathology review and therefore surgical excision is not routinely indicated. The risk conferred by LCIS is independent of family history. Chemoprevention reduces breast cancer risk by 40-65% in women at elevated risk, and data from the NSABP P-1 trial4 suggest that women with LCIS derive even greater benefit than this estimate.  From an imaging surveillance standpoint, patients with LCIS can opt for enhanced surveillance including annual mammogram and annual MRI.  The patient appears to understand and all questions were answered.      Plan:    - Consents completed and signed. Discussed the risks and benefits of right partial mastectomy at length including estimated time for procedure, postoperative recovery, and postoperative instructions. Discussed the risks to include pain, bleeding, infection, nerve injury, numbness, seroma, skin complications including necrosis, breast asymmetry, need for re-excision, and scar.  Patient appears to understand and wishes to proceed.  All questions were answered.  - OR for right partial mastectomy for architectural distortion/calcifications  - High risk - Imaging plan: amenable to MRI and mammograms moving forward. Will plan to at minimum obtain annual mammograms, and follow up interest in regular MRIs as well.  Medical oncology will plan on referral following surgery. She states interest ernst Mendez MD  Breast Surgical Oncology    I spent 70 minutes caring for Emilia on this date of service. This time includes time spent by me in the following activities: preparing for the visit, reviewing tests, obtaining and/or reviewing a separately obtained history, performing a medically appropriate examination and/or evaluation , counseling and educating the patient/family/caregiver, ordering medications, tests, or procedures, referring and communicating with other  health care professionals , documenting information in the medical record, independently interpreting results and communicating that information with the patient/family/caregiver, and care coordination.      CC:  Evgeny Andrew III, MD MARI Antoine PA O[facility]

## 2024-07-15 NOTE — H&P (VIEW-ONLY)
"[facility]     Referring Provider: Lexie Shen     Chief complaint:  newly diagnosed LCIS     Subjective   HPI: Ms. Emilia Squires is a 69 y.o. yo woman, seen at the request of Lexie Shen , for a new diagnosis of LCIS.      This was initially detected as an imaging abnormality on routine screening. She had a long absence in her mammogram history - states her last mammogram prior to this round of imaging was at the \"[facility]\" which would be the [facility] location [facility] - which has been [place] for over 20 years. She said she completed this mammogram because it was time to take care of herself.     She follows with her PCP Mauricio Andrew - RASHI who has well documented history of alcohol abuse, concern from family members and impact on drinking on her job performance. She did not disclose specific alcohol abuse or misuse but reported 2-4 glasses of wine.    She was instructed to start taking progesterone to thin the lining of her uterus but she only took 1 pill. Unable to recall reason for short periord of attempted therapy.    She denies any breast lumps, pain, skin changes, or nipple discharge.She denies any prior history of abnormal mammograms or breast biopsies.     She reports a pertinent PMH significant for DM, depression, .     She denies any family history of breast or ovarian cancer.     She was by herself in clinic today.       Review of Systems   Constitutional: Negative.    HENT:  Negative.     Eyes: Negative.    Respiratory: Negative.     Cardiovascular: Negative.    Gastrointestinal: Negative.    Endocrine: Negative.    Genitourinary: Negative.     Musculoskeletal: Negative.    Skin: Negative.    Neurological: Negative.    Hematological: Negative.    Psychiatric/Behavioral: Negative.         Medications:    Current Outpatient Medications:     Blood Glucose Monitoring Suppl (OneTouch Verio Flex System) w/Device kit, Use to test blood glucose up to four times daily as " needed., Disp: 1 kit, Rfl: 0    Cholecalciferol (VITAMIN D3) 5000 UNITS capsule capsule, Take  by mouth., Disp: , Rfl:     folic acid (FOLVITE) 1 MG tablet, , Disp: , Rfl:     glipizide (GLUCOTROL) 5 MG tablet, Take 1 tablet by mouth 2 (Two) Times a Day Before Meals., Disp: 180 tablet, Rfl: 1    glucose blood test strip, Use to test blood glucose up to four times daily as needed., Disp: 100 each, Rfl: 0    Lancets misc, Use to test blood glucose up to four times daily as needed., Disp: 100 each, Rfl: 0    losartan (COZAAR) 25 MG tablet, Take 1 tablet by mouth Daily., Disp: 90 tablet, Rfl: 1    Multiple Vitamin (MULTI VITAMIN DAILY PO), Take  by mouth., Disp: , Rfl:     naltrexone (DEPADE) 50 MG tablet, [[id number]]: NALTREXONE, 50 MG X 1 TABLET(S) , ORAL, TABLET, ONCE A DAY IN THE MORNING, FOR 30 DAYS, Disp: , Rfl:     Progesterone (PROMETRIUM) 200 MG capsule, Take 1 capsule by mouth Every Night., Disp: , Rfl:     traZODone (DESYREL) 100 MG tablet, , Disp: , Rfl:     Allergies:  Allergies   Allergen Reactions    Penicillins Swelling     THROAT SWELLS CLOSED       Medical history:  Past Medical History:   Diagnosis Date    Acquired coagulation factor deficiency     Alcohol abuse     Depression     Diabetes mellitus     Diverticulitis     Hypertension        Surgical History:  Past Surgical History:   Procedure Laterality Date    APPENDECTOMY      BREAST BIOPSY Left     ILEOSTOMY CLOSURE         Family History:  Family History   Problem Relation Age of Onset    Ovarian cancer Mother     Diabetes Father     Colon cancer Father     Diabetes Brother     No Known Problems Daughter        Cancer Family History: Age of diagnosis/Age at death OR Age as of today  Breast Cancer: N/A  Ovarian Cancer: N/A  Pancreatic Cancer: N/A  Prostate Cancer: N/A  Colon Cancer: Father Dx (age 78) past (age 85)  Lung Cancer: N/A    Social History:   Social History     Socioeconomic History    Marital status:    Tobacco Use    Smoking  "status: Never    Smokeless tobacco: Never   Vaping Use    Vaping status: Never Used   Substance and Sexual Activity    Alcohol use: Yes     Alcohol/week: 2.0 - 3.0 standard drinks of alcohol     Types: 2 - 3 Glasses of wine per week     Comment: daily wine    Drug use: Never    Sexual activity: Defer       She lives with her .  She is retired.       GYNECOLOGIC HISTORY:   . P: 1. AB: 0.  Last menstrual period: 55 years old  Age at menarche: 14  Age at first childbirth: 42  Lactation: 3 months   Age at menopause: 55  Total years of oral contraceptive use: 0  Total years of hormone replacement therapy: 0      Objective   PHYSICAL EXAMINATION  This exam was chaperoned by: Shanthi Victor   /84   Pulse 110   Resp 16   Ht 165.1 cm (65\")   Wt 76.7 kg (169 lb)   SpO2 98%   BMI 28.12 kg/m²   ECOG 0 - Asymptomatic  General: NAD, ill fitted glasses, not always able to answer direct questo  Psych: a&o x 3, normal mood and affect  Eyes: EOMI, no scleral icterus  ENMT: neck supple without masses or thyromegaly, mucus membranes moist  Resp: normal effort  CV: RRR, no edema   GI: soft, NT, ND  MSK: normal gait, normal ROM in bilateral shoulders  Lymph nodes:  no cervical, supraclavicular or axillary lymphadenopathy  Breast: symmetric, bra 34 D, large breast width.   Right:  No visible abnormalities on inspection while seated, with arms raised or hands on hips. No masses, skin changes, or nipple abnormalities.  Left:  No visible abnormalities on inspection while seated, with arms raised or hands on hips.No masses, skin changes, or nipple abnormalities.       Imaging - documented images below have been personally reviewed:  3/1/24 bilateral screening mammogram ([facility])  Scattered areas of fibroglandular density.  Finding 1: there is a focal asymmetry measuring 14 mm with associated architectural distoriton seen in the middle one thired region of the right breast at 11:00 located 7 cm fn.   Finding 2: calcification with " grouped distribution int he middle one third of the right brast as 12 oclock, 8 cmfn  Impression:   Finding 1 - focal asymmetry requires additional evaluation  Finding 2 - calcifications in the right breast require additional evaluation.   Finding 3 - mass in the left breast requires additional evaluation  BIRADS0    3/20/24 bilateral mammogram  Scattered areas of fibroglandular density.  Finding 1: there is a coal asymmetry measuring 14 mm with associated architectural distortion in the middle one third of the right breast 11:00 5 cmfn. Finding persissists.   Finding 2 - additional evaluation for calcifications in the right breast 12:00 seen on 3/1/23 on present there are course heterogeneous calcifications measuring 6mm with grouped distribution in the middle 1/3 of the right breast at 12:00 8 cmfn.  Finding 3: additional evalution for the oval mass in the left breast 2:00. On present there is an oval mass measuring 11 mm. In the middle one third region of the left breast 2:00, 8 cmfn.  Breast US  Finding 1 - two irrevular non-parallel hypoechoic avascular masses with indisticnt margins largest of which is 7mm in the middle one third  region of the right breast 11:00, 5 cmfn. There are decrased posterior echoes, edge shadows are excluded. Adjacent  masses are approximatly 8 mm apart and span 18mm.   Finding 3 - oval parallel hypoechoic mass with circumscribed mass measures 12 x 5 x 9 mm in the middle one thirdregion of the left breast at 2:00 8 cmfn.  Visualsed bilateral axillary lymph nodes are normal.   IMPRESSION  Finding 1 - mass in the right breast are susupcious - recommend biopsy. Confirm ultrasound biopsy samples abnormal mammogram findings  Finding 2 - cacifications are suspicious in the right breast - recommend biopsy  Finding 3 - mass in the left breast is suspicious - biopsy is recommended.     Biopsies:  4/3/24 Left US Biopsy  Left breast 2:00 was prepped, using a 12 gauge biopsy device, 4 cores were  obtained. A [place] shaped tissue marker was placed. 2 view mammogram showed clip in the expected location - pathology returned as fibroadenoma. Pathology is benign and concordant.   6/14/24 Right Stereotactic biopsies  Site1 - architectular distortion in the upper outer right breast was loclaied, using an 8 gauge needle, 6 cores were obtained. Specimen radiograph showed a mixed and striated denisty tissue confirming correct sampling. A bowtie clip was placed. Pathology showed LCIS. Pathology is concordant and high risk. Two view mammogram showed clip in the expected location with a 3.8 cm post biopsy hematoma. Additional distorition is identified and mared on imaging, aproximatly 0.8 cm lateral to the biopsy site        Pathology:  6/14/24  1.  Right breast, 10:00 (site A), stereotactic biopsies for architectural distortion (bowtie clip): LOBULAR CARCINOMA IN SITU (LCIS) INVOLVING ADENOSIS.               -Microcalcifications present associated with adenosis and LCIS.               -No evidence of invasive carcinoma identified.     2.  Right breast, 12:00 (site B), stereotactic biopsies for calcifications (triple twist clip):               -Fibroadenomatoid hyperplasia with associated stromal calcifications.    Assessment & Plan   Assessment:  69 y.o. F with a new diagnosis of Right Breast LCIS    Discussion:  The rate of cancer development in the setting of LCIS has been reported to be approximately 2% per year, translating into a cumulative long-term rate of 26% at 15 years. While a minority of patients elect to proceed with bilateral prophylactic mastectomy for LCIS, the estimated breast cancer incidence is generally not considered high enough to justify such extensive surgery. Furthermore, women with a history of LCIS typically develop low- or intermediate-grade malignancies clinically detected at an early stage. Multiple studies support a low upgrade rate on excision (1-5% for patients diagnosed with LCIS on  core biopsy and radiology-pathology concordant) with a 1% upgrade rate reported in a prospective study with central pathology review and therefore surgical excision is not routinely indicated. The risk conferred by LCIS is independent of family history. Chemoprevention reduces breast cancer risk by 40-65% in women at elevated risk, and data from the NSABP P-1 trial4 suggest that women with LCIS derive even greater benefit than this estimate.  From an imaging surveillance standpoint, patients with LCIS can opt for enhanced surveillance including annual mammogram and annual MRI.  The patient appears to understand and all questions were answered.      Plan:    - Consents completed and signed. Discussed the risks and benefits of right partial mastectomy at length including estimated time for procedure, postoperative recovery, and postoperative instructions. Discussed the risks to include pain, bleeding, infection, nerve injury, numbness, seroma, skin complications including necrosis, breast asymmetry, need for re-excision, and scar.  Patient appears to understand and wishes to proceed.  All questions were answered.  - OR for right partial mastectomy for architectural distortion/calcifications  - High risk - Imaging plan: amenable to MRI and mammograms moving forward. Will plan to at minimum obtain annual mammograms, and follow up interest in regular MRIs as well.  Medical oncology will plan on referral following surgery. She states interest ernst Mendez MD  Breast Surgical Oncology    I spent 70 minutes caring for Emilia on this date of service. This time includes time spent by me in the following activities: preparing for the visit, reviewing tests, obtaining and/or reviewing a separately obtained history, performing a medically appropriate examination and/or evaluation , counseling and educating the patient/family/caregiver, ordering medications, tests, or procedures, referring and communicating with other  health care professionals , documenting information in the medical record, independently interpreting results and communicating that information with the patient/family/caregiver, and care coordination.      CC:  Evgeny Andrew III, MD MARI Antoine PA OBG[facility]

## 2024-07-20 LAB — CREAT BLDA-MCNC: 0.9 MG/DL (ref 0.6–1.3)

## 2024-08-01 ENCOUNTER — TELEPHONE (OUTPATIENT)
Dept: SURGERY | Facility: CLINIC | Age: 69
End: 2024-08-01
Payer: COMMERCIAL

## 2024-08-01 DIAGNOSIS — D05.01 BREAST NEOPLASM, TIS (LCIS), RIGHT: Primary | ICD-10-CM

## 2024-08-01 NOTE — TELEPHONE ENCOUNTER
PT called stating she will not be doing the MRI even though Dr. Mendez recommends doing them. We will move ahead and schedule surgery.    MEN

## 2024-08-01 NOTE — TELEPHONE ENCOUNTER
Called PT and left her a voicemail about the following appts and surgery with Dr. Mendez.    PATs: 08/06/2024 at 02:00 PM    Surgery:08/12/2024  Arrive:05:30 AM  Needle Loc:07:30 AM  Start:08:30 AM    Post-op:08/27/2024 at 09:00 AM    Surgery packet sent in the mail 08/01/2024    Ptcan call back with any questions she may have.    MEN

## 2024-08-06 ENCOUNTER — PRE-ADMISSION TESTING (OUTPATIENT)
Dept: PREADMISSION TESTING | Facility: HOSPITAL | Age: 69
End: 2024-08-06
Payer: COMMERCIAL

## 2024-08-06 VITALS
SYSTOLIC BLOOD PRESSURE: 151 MMHG | HEART RATE: 116 BPM | RESPIRATION RATE: 18 BRPM | OXYGEN SATURATION: 99 % | BODY MASS INDEX: 28.7 KG/M2 | DIASTOLIC BLOOD PRESSURE: 93 MMHG | TEMPERATURE: 97.9 F | HEIGHT: 63 IN | WEIGHT: 162 LBS

## 2024-08-06 DIAGNOSIS — D05.01 BREAST NEOPLASM, TIS (LCIS), RIGHT: ICD-10-CM

## 2024-08-06 LAB
ANION GAP SERPL CALCULATED.3IONS-SCNC: 17 MMOL/L (ref 5–15)
BASOPHILS # BLD MANUAL: 0.14 10*3/MM3 (ref 0–0.2)
BASOPHILS NFR BLD MANUAL: 2 % (ref 0–1.5)
BUN SERPL-MCNC: 11 MG/DL (ref 8–23)
BUN/CREAT SERPL: 15.1 (ref 7–25)
CALCIUM SPEC-SCNC: 9.7 MG/DL (ref 8.6–10.5)
CHLORIDE SERPL-SCNC: 95 MMOL/L (ref 98–107)
CO2 SERPL-SCNC: 20 MMOL/L (ref 22–29)
CREAT SERPL-MCNC: 0.73 MG/DL (ref 0.57–1)
DEPRECATED RDW RBC AUTO: 61.3 FL (ref 37–54)
EGFRCR SERPLBLD CKD-EPI 2021: 89.2 ML/MIN/1.73
EOSINOPHIL # BLD MANUAL: 0 10*3/MM3 (ref 0–0.4)
EOSINOPHIL NFR BLD MANUAL: 0 % (ref 0.3–6.2)
ERYTHROCYTE [DISTWIDTH] IN BLOOD BY AUTOMATED COUNT: 14.8 % (ref 12.3–15.4)
GLUCOSE SERPL-MCNC: 102 MG/DL (ref 65–99)
HCT VFR BLD AUTO: 41.1 % (ref 34–46.6)
HGB BLD-MCNC: 14.3 G/DL (ref 12–15.9)
LYMPHOCYTES # BLD MANUAL: 0.79 10*3/MM3 (ref 0.7–3.1)
LYMPHOCYTES NFR BLD MANUAL: 6.1 % (ref 5–12)
MCH RBC QN AUTO: 39.3 PG (ref 26.6–33)
MCHC RBC AUTO-ENTMCNC: 34.8 G/DL (ref 31.5–35.7)
MCV RBC AUTO: 112.9 FL (ref 79–97)
MONOCYTES # BLD: 0.43 10*3/MM3 (ref 0.1–0.9)
NEUTROPHILS # BLD AUTO: 5.74 10*3/MM3 (ref 1.7–7)
NEUTROPHILS NFR BLD MANUAL: 80.8 % (ref 42.7–76)
PLAT MORPH BLD: NORMAL
PLATELET # BLD AUTO: 305 10*3/MM3 (ref 140–450)
PMV BLD AUTO: 9.2 FL (ref 6–12)
POTASSIUM SERPL-SCNC: 4.3 MMOL/L (ref 3.5–5.2)
QT INTERVAL: 320 MS
QTC INTERVAL: 439 MS
RBC # BLD AUTO: 3.64 10*6/MM3 (ref 3.77–5.28)
RBC MORPH BLD: NORMAL
SODIUM SERPL-SCNC: 132 MMOL/L (ref 136–145)
VARIANT LYMPHS NFR BLD MANUAL: 11.1 % (ref 19.6–45.3)
WBC MORPH BLD: NORMAL
WBC NRBC COR # BLD AUTO: 7.11 10*3/MM3 (ref 3.4–10.8)

## 2024-08-06 PROCEDURE — 85007 BL SMEAR W/DIFF WBC COUNT: CPT

## 2024-08-06 PROCEDURE — 36415 COLL VENOUS BLD VENIPUNCTURE: CPT

## 2024-08-06 PROCEDURE — 80048 BASIC METABOLIC PNL TOTAL CA: CPT

## 2024-08-06 PROCEDURE — 93005 ELECTROCARDIOGRAM TRACING: CPT

## 2024-08-06 PROCEDURE — 85025 COMPLETE CBC W/AUTO DIFF WBC: CPT

## 2024-08-06 NOTE — DISCHARGE INSTRUCTIONS
Take the following medications the morning of surgery:    NONE    If you are on prescription narcotic pain medication to control your pain you may also take that medication the morning of surgery.      General Instructions:     Do not eat solid food after midnight the night before surgery.  Clear liquids day of surgery are allowed but must be stopped at least two hours before your hospital arrival time.       Allowed clear liquids      Water, sodas, and tea or coffee with no cream or milk added.       12 to 20 ounces of a clear liquid that contains carbohydrates is recommended.  If non-diabetic, have Gatorade or Powerade.  If diabetic, have G2 or Powerade Zero.     Do not have liquids red in color.  Do not consume chicken, beef, pork or vegetable broth or bouillon cubes of any variety as they are not considered clear liquids and are not allowed.      Infants may have breast milk up to four hours before surgery.  Infants drinking formula may drink formula up to six hours before surgery.   Patients who avoid smoking, chewing tobacco and alcohol for 4 weeks prior to surgery have a reduced risk of post-operative complications.  Quit smoking as many days before surgery as you can.  Do not smoke, use chewing tobacco or drink alcohol the day of surgery.   If applicable bring your C-PAP/ BI-PAP machine in with you to preop day of surgery.  Bring any papers given to you in the doctor’s office.  Wear clean comfortable clothes.  Do not wear contact lenses, false eyelashes or make-up.  Bring a case for your glasses.   Bring crutches or walker if applicable.  Remove all piercings.  Leave jewelry and any other valuables at home.  Hair extensions with metal clips must be removed prior to surgery.  The Pre-Admission Testing nurse will instruct you to bring medications if unable to obtain an accurate list in Pre-Admission Testing.        If you were given a blood bank ID arm band remember to bring it with you the day of  surgery.    Preventing a Surgical Site Infection:  For 2 to 3 days before surgery, avoid shaving with a razor because the razor can irritate skin and make it easier to develop an infection.    Any areas of open skin can increase the risk of a post-operative wound infection by allowing bacteria to enter and travel throughout the body.  Notify your surgeon if you have any skin wounds / rashes even if it is not near the expected surgical site.  The area will need assessed to determine if surgery should be delayed until it is healed.  The night prior to surgery shower using a fresh bar of anti-bacterial soap (such as Dial) and clean washcloth.  Sleep in a clean bed with clean clothing.  Do not allow pets to sleep with you.  Shower on the morning of surgery using a fresh bar of anti-bacterial soap (such as Dial) and clean washcloth.  Dry with a clean towel and dress in clean clothing.  Ask your surgeon if you will be receiving antibiotics prior to surgery.  Make sure you, your family, and all healthcare providers clean their hands with soap and water or an alcohol based hand  before caring for you or your wound.    Day of surgery:  Your arrival time is approximately two hours before your scheduled surgery time.  Upon arrival, a Pre-op nurse and Anesthesiologist will review your health history, obtain vital signs, and answer questions you may have.  The only belongings needed at this time will be a list of your home medications and if applicable your C-PAP/BI-PAP machine.  A Pre-op nurse will start an IV and you may receive medication in preparation for surgery, including something to help you relax.     Please be aware that surgery does come with discomfort.  We want to make every effort to control your discomfort so please discuss any uncontrolled symptoms with your nurse.   Your doctor will most likely have prescribed pain medications.      If you are going home after surgery you will receive individualized  written care instructions before being discharged.  A responsible adult must drive you to and from the hospital on the day of your surgery and ideally stay with you through the night.   .  Discharge prescriptions can be filled by the hospital pharmacy during regular pharmacy hours.  If you are having surgery late in the day/evening your prescription may be e-prescribed to your pharmacy.  Please verify your pharmacy hours or chose a 24 hour pharmacy to avoid not having access to your prescription because your pharmacy has closed for the day.    If you are staying overnight following surgery, you will be transported to your hospital room following the recovery period.  [facility] has all private rooms.    If you have any questions please call Pre-Admission Testing at [phone].  Deductibles and co-payments are collected on the day of service. Please be prepared to pay the required co-pay, deductible or deposit on the day of service as defined by your plan.    Call your surgeon immediately if you experience any of the following symptoms:  Sore Throat  Shortness of Breath or difficulty breathing  Cough  Chills  Body soreness or muscle pain  Headache  Fever  New loss of taste or smell  Do not arrive for your surgery ill.  Your procedure will need to be rescheduled to another time.  You will need to call your physician before the day of surgery to avoid any unnecessary exposure to hospital staff as well as other patients.

## 2024-08-12 ENCOUNTER — ANESTHESIA EVENT (OUTPATIENT)
Dept: PERIOP | Facility: HOSPITAL | Age: 69
End: 2024-08-12
Payer: COMMERCIAL

## 2024-08-12 ENCOUNTER — ANESTHESIA (OUTPATIENT)
Dept: PERIOP | Facility: HOSPITAL | Age: 69
End: 2024-08-12
Payer: COMMERCIAL

## 2024-08-12 ENCOUNTER — APPOINTMENT (OUTPATIENT)
Dept: GENERAL RADIOLOGY | Facility: HOSPITAL | Age: 69
End: 2024-08-12
Payer: COMMERCIAL

## 2024-08-12 ENCOUNTER — HOSPITAL ENCOUNTER (OUTPATIENT)
Facility: HOSPITAL | Age: 69
Setting detail: HOSPITAL OUTPATIENT SURGERY
Discharge: HOME | End: 2024-08-12
Attending: STUDENT IN AN ORGANIZED HEALTH CARE EDUCATION/TRAINING PROGRAM | Admitting: STUDENT IN AN ORGANIZED HEALTH CARE EDUCATION/TRAINING PROGRAM
Payer: COMMERCIAL

## 2024-08-12 ENCOUNTER — TRANSCRIBE ORDERS (OUTPATIENT)
Dept: LAB | Facility: HOSPITAL | Age: 69
End: 2024-08-12
Payer: COMMERCIAL

## 2024-08-12 ENCOUNTER — ANCILLARY PROCEDURE (OUTPATIENT)
Dept: LAB | Facility: HOSPITAL | Age: 69
End: 2024-08-12
Payer: COMMERCIAL

## 2024-08-12 ENCOUNTER — HOSPITAL ENCOUNTER (OUTPATIENT)
Dept: MAMMOGRAPHY | Facility: HOSPITAL | Age: 69
Discharge: HOME | End: 2024-08-12
Payer: COMMERCIAL

## 2024-08-12 VITALS
HEART RATE: 110 BPM | TEMPERATURE: 98 F | OXYGEN SATURATION: 100 % | SYSTOLIC BLOOD PRESSURE: 166 MMHG | DIASTOLIC BLOOD PRESSURE: 108 MMHG | RESPIRATION RATE: 16 BRPM

## 2024-08-12 DIAGNOSIS — D05.01 LOBULAR CARCINOMA IN SITU (LCIS) OF RIGHT BREAST: Primary | ICD-10-CM

## 2024-08-12 DIAGNOSIS — D05.01 LOBULAR CARCINOMA IN SITU (LCIS) OF RIGHT BREAST: ICD-10-CM

## 2024-08-12 DIAGNOSIS — D05.01 BREAST NEOPLASM, TIS (LCIS), RIGHT: ICD-10-CM

## 2024-08-12 LAB
GLUCOSE BLDC GLUCOMTR-MCNC: 107 MG/DL (ref 70–130)
GLUCOSE BLDC GLUCOMTR-MCNC: 110 MG/DL (ref 70–130)

## 2024-08-12 PROCEDURE — 88307 TISSUE EXAM BY PATHOLOGIST: CPT | Performed by: STUDENT IN AN ORGANIZED HEALTH CARE EDUCATION/TRAINING PROGRAM

## 2024-08-12 PROCEDURE — 76098 X-RAY EXAM SURGICAL SPECIMEN: CPT

## 2024-08-12 PROCEDURE — 25010000002 FENTANYL CITRATE (PF) 50 MCG/ML SOLUTION

## 2024-08-12 PROCEDURE — 25010000002 PHENYLEPHRINE 10 MG/ML SOLUTION

## 2024-08-12 PROCEDURE — 25010000002 BUPIVACAINE (PF) 0.5 % SOLUTION 10 ML VIAL: Performed by: STUDENT IN AN ORGANIZED HEALTH CARE EDUCATION/TRAINING PROGRAM

## 2024-08-12 PROCEDURE — 25010000002 LIDOCAINE 1 % SOLUTION: Performed by: STUDENT IN AN ORGANIZED HEALTH CARE EDUCATION/TRAINING PROGRAM

## 2024-08-12 PROCEDURE — 88360 TUMOR IMMUNOHISTOCHEM/MANUAL: CPT | Performed by: STUDENT IN AN ORGANIZED HEALTH CARE EDUCATION/TRAINING PROGRAM

## 2024-08-12 PROCEDURE — 25010000002 ESMOLOL 100 MG/10ML SOLUTION

## 2024-08-12 PROCEDURE — 25010000002 GLYCOPYRROLATE 1 MG/5ML SOLUTION

## 2024-08-12 PROCEDURE — 88342 IMHCHEM/IMCYTCHM 1ST ANTB: CPT | Performed by: STUDENT IN AN ORGANIZED HEALTH CARE EDUCATION/TRAINING PROGRAM

## 2024-08-12 PROCEDURE — 19301 PARTIAL MASTECTOMY: CPT | Performed by: STUDENT IN AN ORGANIZED HEALTH CARE EDUCATION/TRAINING PROGRAM

## 2024-08-12 PROCEDURE — 25010000002 PROPOFOL 10 MG/ML EMULSION

## 2024-08-12 PROCEDURE — 25010000002 CLINDAMYCIN 900 MG/50ML SOLUTION: Performed by: STUDENT IN AN ORGANIZED HEALTH CARE EDUCATION/TRAINING PROGRAM

## 2024-08-12 PROCEDURE — 82948 REAGENT STRIP/BLOOD GLUCOSE: CPT

## 2024-08-12 PROCEDURE — C1819 TISSUE LOCALIZATION-EXCISION: HCPCS

## 2024-08-12 PROCEDURE — 88341 IMHCHEM/IMCYTCHM EA ADD ANTB: CPT | Performed by: STUDENT IN AN ORGANIZED HEALTH CARE EDUCATION/TRAINING PROGRAM

## 2024-08-12 PROCEDURE — 25010000002 PROPOFOL 500 MG/50ML EMULSION

## 2024-08-12 PROCEDURE — 25810000003 LACTATED RINGERS PER 1000 ML: Performed by: STUDENT IN AN ORGANIZED HEALTH CARE EDUCATION/TRAINING PROGRAM

## 2024-08-12 RX ORDER — DIAZEPAM 2 MG/1
2 TABLET ORAL ONCE
Status: DISCONTINUED | OUTPATIENT
Start: 2024-08-12 | End: 2024-08-12

## 2024-08-12 RX ORDER — LIDOCAINE HYDROCHLORIDE 10 MG/ML
3 INJECTION, SOLUTION INFILTRATION; PERINEURAL ONCE
Status: COMPLETED | OUTPATIENT
Start: 2024-08-12 | End: 2024-08-12

## 2024-08-12 RX ORDER — GLYCOPYRROLATE 0.2 MG/ML
INJECTION INTRAMUSCULAR; INTRAVENOUS AS NEEDED
Status: DISCONTINUED | OUTPATIENT
Start: 2024-08-12 | End: 2024-08-12 | Stop reason: SURG

## 2024-08-12 RX ORDER — SODIUM CHLORIDE, SODIUM LACTATE, POTASSIUM CHLORIDE, CALCIUM CHLORIDE 600; 310; 30; 20 MG/100ML; MG/100ML; MG/100ML; MG/100ML
9 INJECTION, SOLUTION INTRAVENOUS CONTINUOUS
Status: DISCONTINUED | OUTPATIENT
Start: 2024-08-12 | End: 2024-08-12 | Stop reason: HOSPADM

## 2024-08-12 RX ORDER — FLUMAZENIL 0.1 MG/ML
0.2 INJECTION INTRAVENOUS AS NEEDED
Status: DISCONTINUED | OUTPATIENT
Start: 2024-08-12 | End: 2024-08-12 | Stop reason: HOSPADM

## 2024-08-12 RX ORDER — DIPHENHYDRAMINE HYDROCHLORIDE 50 MG/ML
12.5 INJECTION INTRAMUSCULAR; INTRAVENOUS
Status: DISCONTINUED | OUTPATIENT
Start: 2024-08-12 | End: 2024-08-12 | Stop reason: HOSPADM

## 2024-08-12 RX ORDER — DEXMEDETOMIDINE HYDROCHLORIDE 100 UG/ML
INJECTION, SOLUTION INTRAVENOUS AS NEEDED
Status: DISCONTINUED | OUTPATIENT
Start: 2024-08-12 | End: 2024-08-12 | Stop reason: SURG

## 2024-08-12 RX ORDER — NALOXONE HCL 0.4 MG/ML
0.2 VIAL (ML) INJECTION AS NEEDED
Status: DISCONTINUED | OUTPATIENT
Start: 2024-08-12 | End: 2024-08-12 | Stop reason: HOSPADM

## 2024-08-12 RX ORDER — FENTANYL CITRATE 50 UG/ML
INJECTION, SOLUTION INTRAMUSCULAR; INTRAVENOUS AS NEEDED
Status: DISCONTINUED | OUTPATIENT
Start: 2024-08-12 | End: 2024-08-12 | Stop reason: SURG

## 2024-08-12 RX ORDER — MIDAZOLAM HYDROCHLORIDE 1 MG/ML
0.5 INJECTION INTRAMUSCULAR; INTRAVENOUS
Status: DISCONTINUED | OUTPATIENT
Start: 2024-08-12 | End: 2024-08-12 | Stop reason: HOSPADM

## 2024-08-12 RX ORDER — ONDANSETRON 2 MG/ML
4 INJECTION INTRAMUSCULAR; INTRAVENOUS ONCE AS NEEDED
Status: DISCONTINUED | OUTPATIENT
Start: 2024-08-12 | End: 2024-08-12 | Stop reason: HOSPADM

## 2024-08-12 RX ORDER — PROPOFOL 10 MG/ML
INJECTION, EMULSION INTRAVENOUS AS NEEDED
Status: DISCONTINUED | OUTPATIENT
Start: 2024-08-12 | End: 2024-08-12 | Stop reason: SURG

## 2024-08-12 RX ORDER — ESMOLOL HYDROCHLORIDE 10 MG/ML
INJECTION INTRAVENOUS AS NEEDED
Status: DISCONTINUED | OUTPATIENT
Start: 2024-08-12 | End: 2024-08-12 | Stop reason: SURG

## 2024-08-12 RX ORDER — LIDOCAINE HYDROCHLORIDE 10 MG/ML
0.5 INJECTION, SOLUTION INFILTRATION; PERINEURAL ONCE AS NEEDED
Status: DISCONTINUED | OUTPATIENT
Start: 2024-08-12 | End: 2024-08-12 | Stop reason: HOSPADM

## 2024-08-12 RX ORDER — LIDOCAINE HYDROCHLORIDE 20 MG/ML
INJECTION, SOLUTION INFILTRATION; PERINEURAL AS NEEDED
Status: DISCONTINUED | OUTPATIENT
Start: 2024-08-12 | End: 2024-08-12 | Stop reason: SURG

## 2024-08-12 RX ORDER — SODIUM CHLORIDE 0.9 % (FLUSH) 0.9 %
3-10 SYRINGE (ML) INJECTION AS NEEDED
Status: DISCONTINUED | OUTPATIENT
Start: 2024-08-12 | End: 2024-08-12 | Stop reason: HOSPADM

## 2024-08-12 RX ORDER — CLINDAMYCIN PHOSPHATE 900 MG/50ML
900 INJECTION, SOLUTION INTRAVENOUS ONCE
Status: COMPLETED | OUTPATIENT
Start: 2024-08-12 | End: 2024-08-12

## 2024-08-12 RX ORDER — PHENYLEPHRINE HYDROCHLORIDE 10 MG/ML
INJECTION INTRAVENOUS AS NEEDED
Status: DISCONTINUED | OUTPATIENT
Start: 2024-08-12 | End: 2024-08-12 | Stop reason: SURG

## 2024-08-12 RX ORDER — DIAZEPAM 5 MG/1
5 TABLET ORAL ONCE
Status: COMPLETED | OUTPATIENT
Start: 2024-08-12 | End: 2024-08-12

## 2024-08-12 RX ORDER — SODIUM CHLORIDE 0.9 % (FLUSH) 0.9 %
3 SYRINGE (ML) INJECTION EVERY 12 HOURS SCHEDULED
Status: DISCONTINUED | OUTPATIENT
Start: 2024-08-12 | End: 2024-08-12 | Stop reason: HOSPADM

## 2024-08-12 RX ADMIN — DEXMEDETOMIDINE HYDROCHLORIDE 10 MCG: 100 INJECTION, SOLUTION INTRAVENOUS at 09:30

## 2024-08-12 RX ADMIN — GLYCOPYRROLATE 0.1 MG: 0.2 INJECTION, SOLUTION INTRAMUSCULAR; INTRAVENOUS at 08:58

## 2024-08-12 RX ADMIN — FENTANYL CITRATE 12.5 MCG: 50 INJECTION, SOLUTION INTRAMUSCULAR; INTRAVENOUS at 09:30

## 2024-08-12 RX ADMIN — SODIUM CHLORIDE, POTASSIUM CHLORIDE, SODIUM LACTATE AND CALCIUM CHLORIDE 9 ML/HR: 600; 310; 30; 20 INJECTION, SOLUTION INTRAVENOUS at 08:48

## 2024-08-12 RX ADMIN — ESMOLOL HYDROCHLORIDE 10 MG: 10 INJECTION, SOLUTION INTRAVENOUS at 10:10

## 2024-08-12 RX ADMIN — PHENYLEPHRINE HYDROCHLORIDE 50 MCG: 10 INJECTION INTRAVENOUS at 09:16

## 2024-08-12 RX ADMIN — FENTANYL CITRATE 25 MCG: 50 INJECTION, SOLUTION INTRAMUSCULAR; INTRAVENOUS at 09:13

## 2024-08-12 RX ADMIN — PROPOFOL 160 MCG/KG/MIN: 10 INJECTION, EMULSION INTRAVENOUS at 08:58

## 2024-08-12 RX ADMIN — DIAZEPAM 5 MG: 5 TABLET ORAL at 06:58

## 2024-08-12 RX ADMIN — Medication 3 ML: at 07:58

## 2024-08-12 RX ADMIN — PROPOFOL 80 MG: 10 INJECTION, EMULSION INTRAVENOUS at 08:58

## 2024-08-12 RX ADMIN — DEXMEDETOMIDINE HYDROCHLORIDE 10 MCG: 100 INJECTION, SOLUTION INTRAVENOUS at 08:58

## 2024-08-12 RX ADMIN — FENTANYL CITRATE 12.5 MCG: 50 INJECTION, SOLUTION INTRAMUSCULAR; INTRAVENOUS at 09:53

## 2024-08-12 RX ADMIN — CLINDAMYCIN PHOSPHATE 900 MG: 900 INJECTION, SOLUTION INTRAVENOUS at 08:48

## 2024-08-12 RX ADMIN — LIDOCAINE HYDROCHLORIDE 60 MG: 20 INJECTION, SOLUTION INFILTRATION; PERINEURAL at 08:58

## 2024-08-12 NOTE — ANESTHESIA POSTPROCEDURE EVALUATION
Patient: Emilia Squires    Procedure Summary       Date: 08/12/24 Room / Location: [facility] OR  / [facility] MAIN OR    Anesthesia Start: 0851 Anesthesia Stop: 1017    Procedure: BREAST LUMPECTOMY WITH NEEDLE LOCALIZATION (Right: Breast) Diagnosis:       Breast neoplasm, Tis (LCIS), right      (Breast neoplasm, Tis (LCIS), right [D05.01])    Surgeons: Jocelyn Mendez MD Provider: Chang Montenegro MD    Anesthesia Type: MAC ASA Status: 3            Anesthesia Type: MAC    Vitals  Vitals Value Taken Time   /109 08/12/24 1050   Temp     Pulse 105 08/12/24 1057   Resp 16 08/12/24 1050   SpO2 100 % 08/12/24 1057   Vitals shown include unfiled device data.        Post Anesthesia Care and Evaluation    Patient location during evaluation: bedside  Patient participation: complete - patient participated  Level of consciousness: awake and alert  Pain management: adequate    Airway patency: patent  Anesthetic complications: No anesthetic complications  PONV Status: controlled  Cardiovascular status: blood pressure returned to baseline and acceptable  Respiratory status: acceptable  Hydration status: acceptable

## 2024-08-12 NOTE — ADDENDUM NOTE
Addendum  created 08/12/24 1138 by Tracey Montes CRNA    Flowsheet accepted, Intraprocedure Flowsheets edited

## 2024-08-12 NOTE — ANESTHESIA PREPROCEDURE EVALUATION
Anesthesia Evaluation     Patient summary reviewed and Nursing notes reviewed   no history of anesthetic complications:   NPO Solid Status: > 8 hours  NPO Liquid Status: > 2 hours           Airway   Mallampati: II  TM distance: >3 FB  Neck ROM: full  Dental      Pulmonary    Cardiovascular     ECG reviewed    (+) hypertension, hyperlipidemia    ROS comment: Sinus tachycardia noted on pre-op monitor. Appears to be baseline from PAT vitals    Neuro/Psych  GI/Hepatic/Renal/Endo    (+) diabetes mellitus    Musculoskeletal     Abdominal    Substance History   (+) alcohol use     OB/GYN          Other          Other Comment: Breast Neoplasm                Anesthesia Plan    ASA 3     MAC     intravenous induction     Anesthetic plan, risks, benefits, and alternatives have been provided, discussed and informed consent has been obtained with: patient.      CODE STATUS:

## 2024-08-12 NOTE — OP NOTE
BREAST LUMPECTOMY WITH NEEDLE LOCALIZATION  Procedure Report    Patient Name:  Emilia Squires  YOB: 1955    Date of Surgery:  8/12/2024       Pre-op Diagnosis:   Breast neoplasm, Tis (LCIS), right [D05.01]       Post-Op Diagnosis Codes:     * Breast neoplasm, Tis (LCIS), right [D05.01]        Procedure(s):  BREAST LUMPECTOMY WITH NEEDLE LOCALIZATION        Staff:  Surgeon(s):  Jocelyn Mendez MD         Anesthesia: Monitored Anesthesia Care    Estimated Blood Loss: minimal    Implants:    Nothing was implanted during the procedure    Specimen:          Specimens       ID Source Type Tests Collected By Collected At Frozen?    A Breast, Right Tissue TISSUE PATHOLOGY EXAM   Jocelyn Mendez MD 8/12/24 0936 No    Description: Right Breast Partial Mastectomy, Short Stitch Superior, Long Stitch Lateral    B Breast, Right Tissue TISSUE PATHOLOGY EXAM   Jocelyn Mendez MD 8/12/24 0939 No    Description: Right Breast Lateral Margin, Stitch Mora True Margin                Findings:   2 wires and 2 clips within surgical specimen, calcifications/architectular distortion localized and excised    Complications: none    Description of Procedure:  The risks and benefits of the procedure were explained in detail to the patient.  Informed consent was obtained.  On the morning of surgery she had 2 localizing wires placed.  1 wire identified the biopsy site of lobular carcinoma in situ, the additional wire localized calcifications and architectural distortion anterior to the original site of biopsy.  The patient was then taken to the operating room and placed supine on the operating room table.  Sequential compression devices were applied to the lower extremities and preoperative antibiotics administered. A time out was then performed to identify the proper patient, procedure, and location.     After the administration of adequate anesthesia, the right breast and axilla were prepped and draped in the standard surgical  fashion. A right upper quadrant radial incision was then made on the breast based on the mammographic images and the positioning of the localizing wires.  Dissection was carried through the skin to the underlying subcutaneous tissues.  Sharp dissection with the use of electrocautery was utilized to dissect down to the level of the clip/microcalcifications/mass.  The region was dissected free from the rest of the breast and removed, care taken to ensure that the lesion in question was positioned near the center of the resected specimen.  The specimen was then marked short stitch superiorly, long stitch laterally.  Intraoperative specimen radiograph then confirmed that the lesion and the previously placed biopsy clip were within the specimen.    Hemostasis ensured.  The underlying breast tissue was re-approximated with a 2-0 Vicryl suture.  The wound was then closed in two layers with absorbable suture (3-0 vicryl deep dermal and 4-0 monocryl subcuticular) and Dermabond applied.  A compressive dressing and Ace wrap were placed over the breast.  The patient tolerated the procedure well and was transported to the post anesthesia care unit in stable condition.        This procedure was not performed to treat breast cancer through sentinel node biopsy        Jocelyn Mendez MD     Date: 8/12/2024  Time: 16:23 EDT

## 2024-08-15 LAB
CYTO UR: NORMAL
LAB AP CASE REPORT: NORMAL
LAB AP SPECIAL STAINS: NORMAL
LAB AP SYNOPTIC CHECKLIST: NORMAL
PATH REPORT.FINAL DX SPEC: NORMAL
PATH REPORT.GROSS SPEC: NORMAL

## 2024-08-16 ENCOUNTER — TELEPHONE (OUTPATIENT)
Dept: SURGERY | Facility: CLINIC | Age: 69
End: 2024-08-16
Payer: COMMERCIAL

## 2024-08-16 DIAGNOSIS — D05.11 DUCTAL CARCINOMA IN SITU (DCIS) OF RIGHT BREAST: Primary | ICD-10-CM

## 2024-08-16 NOTE — TELEPHONE ENCOUNTER
Called Emilia Squires to discuss pathology results.  All questions answered.  I will follow up with her in clinic as scheduled.    Referrals placed to: Medical oncology and Radiation Oncology and nurse navigator.

## 2024-08-19 ENCOUNTER — PATIENT OUTREACH (OUTPATIENT)
Dept: OTHER | Facility: HOSPITAL | Age: 69
End: 2024-08-19
Payer: COMMERCIAL

## 2024-08-19 NOTE — PROGRESS NOTES
Referral received from Dr. Mendez's office. I called Ms. Squires and introduced myself and navigational services. She stated the surgery went well and she is recovering well. She has a newly diagnsosed Right Breast Ductal Carcinoma In Situ; ER/CO +, stage 0.    She has a good understanding of her pathology and treatment options. She was able to verbalize teach back on her plan of care.      She stated she has a wonderful support system with  and family. She stated she feels comfortable talking to them about needs or issues.      She stated she has no financial or transportation concerns at this time. She has no resource needs or ongoing concerns at this time.      She stated she has been anxious about her diagnosis and we discussed that can be normal. Also discussed we have support options if the need arises. She was thankful for the information.      We discussed integrative therapies and other [facility]. She will received a navigation folder with the following information in the mail:     [facility], Cancer and Restorative Exercise (CARE), [employer] Exercise program, Guide for the Newly Diagnosed, [facility], [facility], Massage Therapy, Reiki Therapy, [employer] [facility], Cancer Nutrition, and Survivorship Clinic.     She verbalized appreciation for navigational services and she has my contact information and will call with any questions that arise.

## 2024-08-28 ENCOUNTER — OFFICE VISIT (OUTPATIENT)
Dept: SURGERY | Facility: CLINIC | Age: 69
End: 2024-08-28
Payer: COMMERCIAL

## 2024-08-28 VITALS
SYSTOLIC BLOOD PRESSURE: 152 MMHG | HEART RATE: 122 BPM | OXYGEN SATURATION: 97 % | HEIGHT: 63 IN | BODY MASS INDEX: 28.7 KG/M2 | RESPIRATION RATE: 16 BRPM | DIASTOLIC BLOOD PRESSURE: 90 MMHG | WEIGHT: 162 LBS

## 2024-08-28 DIAGNOSIS — D05.01 BREAST NEOPLASM, TIS (LCIS), RIGHT: ICD-10-CM

## 2024-08-28 DIAGNOSIS — D05.11 DUCTAL CARCINOMA IN SITU (DCIS) OF RIGHT BREAST: Primary | ICD-10-CM

## 2024-08-28 PROCEDURE — 99024 POSTOP FOLLOW-UP VISIT: CPT | Performed by: STUDENT IN AN ORGANIZED HEALTH CARE EDUCATION/TRAINING PROGRAM

## 2024-08-28 NOTE — LETTER
August 28, 2024       No Recipients    Patient: Emilia Squires   YOB: 1955   Date of Visit: 8/28/2024     Dear Evgeny Andrew III, NP-C:       Thank you for referring Emilia Squires to me for evaluation. Below are the relevant portions of my assessment and plan of care.    If you have questions, please do not hesitate to call me. I look forward to following Emilia along with you.         Sincerely,        Jocelyn Mendez MD        CC:   No Recipients    Jocelyn Mendez MD  08/28/24 2339  Incomplete  Breast Surgical Oncology Post-Op Visit    Surgery:    Subjective: No acute issues. Denies f/c/wnd issues. Intermittent use of pain medication.     O:  Vitals:    08/28/24 1123   BP: 152/90   Pulse: (!) 122   Resp: 16   SpO2: 97%     Breast incision:  healing well without evidence of infection or significant seroma  Axillary Incision: healing well without evidence of infection or significant seroma    Pathology:   8/12/24  1.   Right breast, partial mastectomy:         A.  Classical type lobular carcinoma in situ involving radial scar and adenosis               1.  Extensive background atypical lobular hyperplasia                2.  Microcalcifications associated with lobular carcinoma in situ, atypical lobular hyperplasia, and adenosis               3.  Negative for invasive carcinoma               4.  Two clips and biopsy sites          B.  Intermediate grade ductal carcinoma in situ (DCIS), solid type               1.  Extent of DCIS: 18 mm (based on slices involved)               2. Margins negative for in situ carcinoma; closest distance: DCIS is present 5 mm from the lateral margin                  (superseded by specimen #2) and 8 mm from the medial margin         C.  See synoptic report.     2.  Right breast, additional lateral margin, reexcision:         A.  Breast parenchyma with no significant histopathologic changes               (additional 8 mm of tissue free of  "carcinoma)    Assessment: s/p partial mastectomy for  Stage 0 ([id number], ER+/WA+/Bct4dah tested) right breast cancer recovering well.  Discussed pathology at length as well as planned/possible adjuvant treatments.  All questions answered.    Partial mastectomy with upstage from initial biopsy results of LCIS with adenosis - calcifications were associated with LCIS    Supportive oncology needs - psychological support with history of ETOH abuse, has come to appointments in clothing smelling of urine and unkept clothing. Clinic appointments she has been along but her  came to her surgery. He continues to work as a dentist and reported his job requires frequent travel.     Plan:   - RTC 6 months with [facility]  - Next imaging due 3/2025 at [facility]. No plans for MRI at this time. Patient is not interested in doing \"too much\"  - Med Onc referral - apt in September  - Rad Onc referral- apt in September  - nurse navigator referral placed -  She has significant needs with ongoing alcohol use - down to about 1 drink a day, history of abuse.  worried at time of surgery she has been drinking in the middle of the night.          Jocelyn Mendez MD  Breast Surgical Oncology    "

## 2024-08-29 ENCOUNTER — PATIENT OUTREACH (OUTPATIENT)
Dept: OTHER | Facility: HOSPITAL | Age: 69
End: 2024-08-29
Payer: COMMERCIAL

## 2024-08-29 DIAGNOSIS — D05.11 DUCTAL CARCINOMA IN SITU OF RIGHT BREAST: Primary | ICD-10-CM

## 2024-08-29 NOTE — PROGRESS NOTES
Called Ms. Squires and FAINA letting her know we placed a social work referral to help with any other resource needs and to expect a call sometime soon.

## 2024-08-29 NOTE — PROGRESS NOTES
Breast Surgical Oncology Post-Op Visit    Surgery:  right breast partial mastectomy  Subjective: No acute issues. Denies f/c/wnd issues. Intermittent use of pain medication.     O:  Vitals:    08/28/24 1123   BP: 152/90   Pulse: (!) 122   Resp: 16   SpO2: 97%     Breast incision:  healing well without evidence of infection or significant seroma  Axillary Incision: healing well without evidence of infection or significant seroma    Pathology:   8/12/24  1.   Right breast, partial mastectomy:         A.  Classical type lobular carcinoma in situ involving radial scar and adenosis               1.  Extensive background atypical lobular hyperplasia                2.  Microcalcifications associated with lobular carcinoma in situ, atypical lobular hyperplasia, and adenosis               3.  Negative for invasive carcinoma               4.  Two clips and biopsy sites          B.  Intermediate grade ductal carcinoma in situ (DCIS), solid type               1.  Extent of DCIS: 18 mm (based on slices involved)               2. Margins negative for in situ carcinoma; closest distance: DCIS is present 5 mm from the lateral margin                  (superseded by specimen #2) and 8 mm from the medial margin         C.  See synoptic report.     2.  Right breast, additional lateral margin, reexcision:         A.  Breast parenchyma with no significant histopathologic changes               (additional 8 mm of tissue free of carcinoma)    Assessment: s/p partial mastectomy for  Stage 0 (WyhT1V1, ER+/AL+/Hlu6rrw tested) right breast cancer recovering well.  Discussed pathology at length as well as planned/possible adjuvant treatments.  All questions answered.    Partial mastectomy with upstage from initial biopsy results of LCIS with adenosis - calcifications were associated with LCIS    Supportive oncology needs - psychological support with history of ETOH abuse, has come to appointments in clothing smelling of urine and unkept  "clothing. Clinic appointments she has been along but her  came to her surgery. He continues to work as a dentist and reported his job requires frequent travel.     Plan:   - RTC 6 months with [facility]  - Next imaging due 3/2025 at [facility]. No plans for MRI at this time. Patient is not interested in doing \"too much\"  - Med Onc referral - apt in September  - Rad Onc referral- apt in September  - nurse navigator referral placed -  She has significant needs with ongoing alcohol use - down to about 1 drink a day, history of abuse.  worried at time of surgery she has been drinking in the middle of the night.          Jocelyn Mendez MD  Breast Surgical Oncology    "

## 2024-09-06 ENCOUNTER — TELEPHONE (OUTPATIENT)
Dept: RADIATION ONCOLOGY | Facility: HOSPITAL | Age: 69
End: 2024-09-06
Payer: COMMERCIAL

## 2024-09-06 ENCOUNTER — TELEPHONE (OUTPATIENT)
Dept: OTHER | Facility: HOSPITAL | Age: 69
End: 2024-09-06
Payer: COMMERCIAL

## 2024-09-06 NOTE — TELEPHONE ENCOUNTER
Oncology Social Work    Referral received from Nurse Navigator Minoo.  OSW called both phone numbers that are on her demographic sheet.  No Answer. Left voicemail messages.  Offered to see patient on 9/9/2024 at 12:30 prior to her appt with Dr Esposito.  Awaiting return call from patient.     Jocelyn Vásquez, JAYYW, LCSW

## 2024-09-09 ENCOUNTER — TELEPHONE (OUTPATIENT)
Dept: OTHER | Facility: HOSPITAL | Age: 69
End: 2024-09-09
Payer: COMMERCIAL

## 2024-09-09 NOTE — TELEPHONE ENCOUNTER
ONCOLOGY SOCIAL WORKER PSYCHOSOCIAL ASSESSMENT    Date:  9/9/2024      Reason for Visit:   Referral received from Dr. Mendez for psychosocial needs     I.    PHYSICAL:     Diagnosis:   Patient Active Problem List   Diagnosis    AA (alcohol abuse)    Benign essential HTN    Blues    Hyperlipidemia    Type 2 diabetes mellitus with hyperglycemia, without long-term current use of insulin    Vitamin D deficiency    Breast neoplasm, Tis (LCIS), right    Ductal carcinoma in situ (DCIS) of right breast      Date of Diagnosis:  July 2024    Treatment:  Surgery - s/p lumpectomy     Other:  TBD - ? Chemo ? Radiation      II.   FINANCIAL:  Employment Status:  Retired  VA Benefits: No  Source of Income:  Social Security and [facility]  Other:  's income . Patient states no financial distress  Transportation Issues:  No   Means of Transportation:  private car  Prescription Drug Coverage:  Yes  Medications Unable to Afford:  unknown   Pharmacy Name/Location:    [id number] - [place], [place] - [address] AT [facility] [address] - [phone]  - [phone]   [address] [facility] [address]  Phone: [phone] Fax: [phone]    [facility]  [address] [facility] [address]  Phone: [phone] Fax: [phone]     III.  SOCIAL:    Marital Status:    Significant Other:  Yes,   Children :  Yes- 1 adult Daughter whom lives in [place]   Do the children know about the cancer diagnosis:  Yes  Does the patient have:  Living Will / Advanced Directive ? - unknown   Home Situation:  lives at home with .     Patient's Support System:  limited.     ADLs - Functioning Level at Home:  Independent  Able to (on own):  Walk, Bathe, Dress, and Cook  Current DME:  none   Current Home Health:  none   Dialysis:  No,   Any Drug Use History:  Yes - ETOH  Patient attended a 30 day inpatient program for her chemical dependency- she thinks the facility  was [facility]  She is still drinking wine in the evenings.  Attends AA meeting sporadically.  Does Not have a Sponsor.     IV.    MENTAL:    Emotional Status:  Depressed and Flat - Via telephone     Mental Status:  Alert and Oriented  Any current psychiatric illness:  Yes, Depression  History of psychiatric illness:  unknown ,   Family history of psychiatric illness:  unknown,   Current Psychiatrist:  none   Current Therapist:  none   Taking any psychiatric medications:  No,   Suicidal Ideation:  No;    Homicidal Ideation:  No;   Grief reaction: Patient is no longer working and misses this part of her life.  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  Feels lost and misses co-workers, daily routine of having a job.      V.    SPIRITUAL:    Orthodoxy Affiliation:  Episcopalian   Attend Services Regularly:  Yes  Any spiritual support:  Yes      VI.   OTHER:    Education Level:  Some College  Legal Issues:  No      VII.  GOALS / NEEDS:    Goals:  To protect sobriety , to increase social network , to achieve restorative sleep.  Needs:  To attend AA meetings daily and establish a Sponsor.  Participate in [employer] offerings.   Referrals Made:  none at this time.        VIII.  PATIENT CONCERNS:  Social Isolation and fear of needing chemo and/or radiation.  Treatment plans still to be determined.       IX.    NOTES:    Will ask OSW Team to follow up at her 9/23 Appt while she is on campus.       Jocelyn Vásquez, FLORES  09/09/24  16:39 EDT

## 2024-09-11 ENCOUNTER — TELEPHONE (OUTPATIENT)
Dept: ONCOLOGY | Facility: CLINIC | Age: 69
End: 2024-09-11
Payer: COMMERCIAL

## 2024-09-11 NOTE — TELEPHONE ENCOUNTER
Caller: Emilia Squires    Relationship to patient: Self    Best call back number:   Telephone Information:   Mobile [phone]     Type of visit: NEW LAB & NEW APPT    Requested date: CALL TO R/S      If rescheduling, when is the original appointment: 9/23/2024

## 2024-09-23 ENCOUNTER — TELEPHONE (OUTPATIENT)
Dept: OTHER | Facility: HOSPITAL | Age: 69
End: 2024-09-23
Payer: COMMERCIAL

## 2024-09-24 ENCOUNTER — TELEPHONE (OUTPATIENT)
Dept: OTHER | Facility: HOSPITAL | Age: 69
End: 2024-09-24
Payer: COMMERCIAL

## 2024-09-25 ENCOUNTER — TELEPHONE (OUTPATIENT)
Dept: OTHER | Facility: HOSPITAL | Age: 69
End: 2024-09-25
Payer: COMMERCIAL

## 2024-09-26 ENCOUNTER — TELEPHONE (OUTPATIENT)
Dept: OTHER | Facility: HOSPITAL | Age: 69
End: 2024-09-26
Payer: COMMERCIAL

## 2024-10-01 ENCOUNTER — TELEPHONE (OUTPATIENT)
Dept: RADIATION ONCOLOGY | Facility: HOSPITAL | Age: 69
End: 2024-10-01
Payer: COMMERCIAL

## 2024-10-02 ENCOUNTER — CONSULT (OUTPATIENT)
Dept: RADIATION ONCOLOGY | Facility: HOSPITAL | Age: 69
End: 2024-10-02
Payer: COMMERCIAL

## 2024-10-02 VITALS
HEART RATE: 99 BPM | OXYGEN SATURATION: 99 % | BODY MASS INDEX: 28.17 KG/M2 | SYSTOLIC BLOOD PRESSURE: 141 MMHG | DIASTOLIC BLOOD PRESSURE: 85 MMHG | WEIGHT: 159 LBS

## 2024-10-02 DIAGNOSIS — D05.11 DUCTAL CARCINOMA IN SITU (DCIS) OF RIGHT BREAST: Primary | ICD-10-CM

## 2024-10-02 PROCEDURE — 77263 THER RADIOLOGY TX PLNG CPLX: CPT | Performed by: RADIOLOGY

## 2024-10-02 PROCEDURE — G0463 HOSPITAL OUTPT CLINIC VISIT: HCPCS | Performed by: RADIOLOGY

## 2024-10-02 NOTE — PROGRESS NOTES
CC: DCIS right breast  pTisNx                             Dear Jocelyn Mendez MD    I had the pleasure of seeing Emilia Squires  today in the [facility].   The patient is a 69 y.o. 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  She had a screening mammogram on 3/1/24 which showed a focal asymmetry measuring 14 mm with associated architectural distoriton seen in the middle one thired region of the right breast at 11:00 located 7 cm fn. Finding 2: calcification with grouped distribution int he middle one third of the right breast as 12 oclock, 8 cmfnImpression: Finding 1 - focal asymmetry requires additional evaluation  Finding 2 - calcifications in the right breast require additional evaluation. Finding 3 - mass in the left breast requires additional evaluation  BIRADS0.     She had a bilateral diagnostic mammogram 3/20/24 which showed asymmetry measuring 14 mm with associated architectural distortion in the middle one third of the right breast 11:00 5 cmfn.  Finding 2 - additional evaluation for calcifications in the right breast 12:00 seen on 3/1/23 on present there are course heterogeneous calcifications measuring 6mm with grouped distribution in the middle 1/3 of the right breast at 12:00 8 cmfn. Finding 3: additional evalution for the oval mass in the left breast 2:00. On present there is an oval mass measuring 11 mm. In the middle one third region of the left breast 2:00, 8 cmfn. Ultrasound showed Finding 1 - two irregular non-parallel hypoechoic avascular masses with indisticnt margins largest of which is 7mm in the middle one third  region of the right breast 11:00, 5 cmfn. There are decrased posterior echoes, edge shadows are excluded. Adjacent  masses are approximatly 8 mm apart and span 18mm.  Finding 3 - oval parallel hypoechoic mass with circumscribed mass measures 12 x 5 x 9 mm in the middle one thirdregion of the left breast at 2:00 8 cmfn. Visualized bilateral axillary lymph  nodes are normal. IMPRESSION Finding 1 - mass in the right breast are susupcious - recommend biopsy. Confirm ultrasound biopsy samples abnormal mammogram findings Finding 2 - cacifications are suspicious in the right breast - recommend biopsy Finding 3 - mass in the left breast is suspicious - biopsy is recommended.     She underwent us guided biopsy of the left breast 2 oclock on 4/3/24 with pathology revealing fibroadenoma.  She underwent stereotactic biopsy of the right breast on 24 with pathology from the 10 oclock position revealing LCIS and from the 12 oclock position fibroadenomatoid hyperplasia.    She underwent a right breast lumpectomy 24 with pathology revealing 18mm intermediate grade DCIS solid type ER NV positive, located 8mm from the closest medial margin.     She is . P: 1. AB: 0. Menopause 55 years old, menarche age 14.  Age at first childbirth: 42. She breast fed for 3 months.Total years of oral contraceptive use: 0 Total years of hormone replacement therapy: 0.    She is recovering well from her surgery and referred today for evaluation for adjuvant radiation.      .  Review of Systems   Constitutional:  Positive for appetite change.   HENT: Negative.     Respiratory: Negative.     Cardiovascular: Negative.    Gastrointestinal: Negative.    Musculoskeletal:  Positive for back pain.   Neurological: Negative.    Psychiatric/Behavioral: Negative.         Past Medical History:   Diagnosis Date    Acquired coagulation factor deficiency     Alcohol abuse     Depression     Diabetes mellitus     Diverticulitis     Hypertension          Past Surgical History:   Procedure Laterality Date    APPENDECTOMY      BREAST BIOPSY Left     BREAST LUMPECTOMY Right 2024    Procedure: BREAST LUMPECTOMY WITH NEEDLE LOCALIZATION;  Surgeon: Jocelyn Mendez MD;  Location: [facility];  Service: General;  Laterality: Right;    ILEOSTOMY CLOSURE           Social History     Socioeconomic History     Marital status:    Tobacco Use    Smoking status: Never    Smokeless tobacco: Never   Vaping Use    Vaping status: Never Used   Substance and Sexual Activity    Alcohol use: Yes     Alcohol/week: 2.0 - 3.0 standard drinks of alcohol     Types: 2 - 3 Glasses of wine per week     Comment: daily wine    Drug use: Never    Sexual activity: Defer         Family History   Problem Relation Age of Onset    Ovarian cancer Mother     Diabetes Father     Colon cancer Father     Diabetes Brother     No Known Problems Daughter     Malig Hyperthermia Neg Hx           Objective    Physical Exam  Constitutional:       Appearance: Normal appearance.   HENT:      Head: Atraumatic.   Eyes:      Extraocular Movements: Extraocular movements intact.   Chest:          Comments: Well healed incision uoq right breast, no palpable masses   Musculoskeletal:         General: Normal range of motion.   Neurological:      General: No focal deficit present.      Mental Status: She is alert.   Psychiatric:         Mood and Affect: Mood normal.         Behavior: Behavior normal.         Current Outpatient Medications on File Prior to Visit   Medication Sig Dispense Refill    Blood Glucose Monitoring Suppl (OneTouch Verio Flex System) w/Device kit Use to test blood glucose up to four times daily as needed. 1 kit 0    Cholecalciferol (VITAMIN D3) 5000 UNITS capsule capsule Take  by mouth.      folic acid (FOLVITE) 1 MG tablet       glipizide (GLUCOTROL) 5 MG tablet Take 1 tablet by mouth 2 (Two) Times a Day Before Meals. 180 tablet 1    glucose blood test strip Use to test blood glucose up to four times daily as needed. 100 each 0    Lancets misc Use to test blood glucose up to four times daily as needed. 100 each 0    losartan (COZAAR) 25 MG tablet Take 1 tablet by mouth Daily. 90 tablet 1    Multiple Vitamin (MULTI VITAMIN DAILY PO) Take 1 tablet by mouth Daily. HOLD PER MD MCDONALD      naltrexone (DEPADE) 50 MG tablet [[id number]]: NALTREXONE,  50 MG X 1 TABLET(S) , ORAL, TABLET, ONCE A DAY IN THE MORNING, FOR 30 DAYS      traZODone (DESYREL) 100 MG tablet        No current facility-administered medications on file prior to visit.       ALLERGIES:    Allergies   Allergen Reactions    Penicillins Other (See Comments)     THROAT SWELLS CLOSED/STHROAT ITCHING AND COUGH..Beta lactam allergy details  Antibiotic reaction: other (itchy throat and cough)  Age at reaction: adult  Dose to reaction time: (!) hours  Reason for antibiotic: other (COLON SURGERY)  Epinephrine required for reaction?: unknown  Tolerated antibiotics: unknown          /85   Pulse 99   Wt 72.1 kg (159 lb)   SpO2 99%   BMI 28.17 kg/m²      (0) Fully active, able to carry on all predisease performance without restriction       No data to display                  Assessment & Plan     70 yo female with dcis of right breast ER VT positive s/p lumpectomy, pTisNx.  I discussed with her my recommendation for post-operative radiation therapy to the right breast to decrease the risk of local recurrence.      I discussed with her in detail the risks, benefits and rationale of radiation therapy to the right breast to include but not limited to the following:    Acute: skin erythema, breakdown/moist desquamation, swelling or discomfort of the breast, fatigue, pneumonitis resulting in shortness of breath, cough or pain    Late: Permanent skin changes including hyperpigmentation, telangiectasias, fibrosis of the breast resulting in smaller size or poor cosmetic outcome, late edema or cellulitis, late rib fracture, late pulmonary fibrosis and the remote risk of second malignancies.      I discussed both whole breast radiation in 15 fractions and accelerated partial breast irradiation in 5 fractions and the pros and cons of each.  She voiced understanding and was given an opportunity to ask questions which I believe were answered to her satisfaction.  She would like to proceed with APBI.  I have  scheduled her to return for CT simulation for treatment planning Monday.  I plan to treat the right breast tumor bed to a dose of approximately 3000cGy in 5 fractions.    I personally spent greater than 60 minutes today assessing, managing, discussing and documenting my visit with the patient. That time includes review of records, imaging and pathology reports, obtaining my own history, performing a medically appropriate evaluation, counseling and educating the patient, discussing goals, logistics, alternatives and risks of my recommendations, surveillance options and potential outcomes. It also includes the time documenting the clinical information in the EMR and communicating my recommendations to the other involved physicians.                Thank you very much for allowing me to participate in the care of this very pleasant patient.    Sincerely,      Lexi Esposito MD

## 2024-10-04 ENCOUNTER — PATIENT OUTREACH (OUTPATIENT)
Dept: OTHER | Facility: HOSPITAL | Age: 69
End: 2024-10-04
Payer: COMMERCIAL

## 2024-10-04 NOTE — PROGRESS NOTES
Reviewed chart. Patient is s/p right breast lumpectomy 8/12/24 with pathology revealing 18mm intermediate grade DCIS solid type ER MS positive, located 8mm from the closest medial margin. Met with Dr. Esposito 10/2, planning CT sim 10/7. Plan right breast tumor bed to a dose of approximately 3000cGy in 5 fractions. Patient missed her 9/23 Dr. Son pimentelt.  Tamela, OSW following.     Called patient on cell, mailbox is full. Unable to leave a message. Called patient's home number. Left message that I work with M[facility] breast navigator. I was just checking in to see how she is doing and if she has any ongoing needs. Requested call back. Left my number and Minoo's

## 2024-10-07 ENCOUNTER — HOSPITAL ENCOUNTER (OUTPATIENT)
Dept: RADIATION ONCOLOGY | Facility: HOSPITAL | Age: 69
Discharge: HOME | End: 2024-10-07
Payer: COMMERCIAL

## 2024-10-07 ENCOUNTER — HOSPITAL ENCOUNTER (OUTPATIENT)
Dept: RADIATION ONCOLOGY | Facility: HOSPITAL | Age: 69
Setting detail: RADIATION/ONCOLOGY SERIES
End: 2024-10-07
Payer: COMMERCIAL

## 2024-10-07 DIAGNOSIS — D05.11 DUCTAL CARCINOMA IN SITU (DCIS) OF RIGHT BREAST: Primary | ICD-10-CM

## 2024-10-07 PROCEDURE — 77334 RADIATION TREATMENT AID(S): CPT | Performed by: RADIOLOGY

## 2024-10-09 PROCEDURE — 77338 DESIGN MLC DEVICE FOR IMRT: CPT | Performed by: RADIOLOGY

## 2024-10-09 PROCEDURE — 77301 RADIOTHERAPY DOSE PLAN IMRT: CPT | Performed by: RADIOLOGY

## 2024-10-09 PROCEDURE — 77300 RADIATION THERAPY DOSE PLAN: CPT | Performed by: RADIOLOGY

## 2024-10-14 ENCOUNTER — HOSPITAL ENCOUNTER (OUTPATIENT)
Dept: RADIATION ONCOLOGY | Facility: HOSPITAL | Age: 69
Discharge: HOME | End: 2024-10-14
Payer: COMMERCIAL

## 2024-10-14 LAB
RAD ONC ARIA COURSE ID: NORMAL
RAD ONC ARIA COURSE INTENT: NORMAL
RAD ONC ARIA COURSE LAST TREATMENT DATE: NORMAL
RAD ONC ARIA COURSE START DATE: NORMAL
RAD ONC ARIA COURSE TREATMENT ELAPSED DAYS: 0
RAD ONC ARIA FIRST TREATMENT DATE: NORMAL
RAD ONC ARIA PLAN FRACTIONS TREATED TO DATE: 1
RAD ONC ARIA PLAN ID: NORMAL
RAD ONC ARIA PLAN PRESCRIBED DOSE PER FRACTION: 6 GY
RAD ONC ARIA PLAN PRIMARY REFERENCE POINT: NORMAL
RAD ONC ARIA PLAN TOTAL FRACTIONS PRESCRIBED: 5
RAD ONC ARIA PLAN TOTAL PRESCRIBED DOSE: 3000 CGY
RAD ONC ARIA REFERENCE POINT DOSAGE GIVEN TO DATE: 6 GY
RAD ONC ARIA REFERENCE POINT ID: NORMAL
RAD ONC ARIA REFERENCE POINT SESSION DOSAGE GIVEN: 6 GY

## 2024-10-14 PROCEDURE — 77385: CPT | Performed by: RADIOLOGY

## 2024-10-14 PROCEDURE — 77285 THER RAD SIMULAJ FIELD INTRM: CPT | Performed by: RADIOLOGY

## 2024-10-16 ENCOUNTER — HOSPITAL ENCOUNTER (OUTPATIENT)
Dept: RADIATION ONCOLOGY | Facility: HOSPITAL | Age: 69
Discharge: HOME | End: 2024-10-16

## 2024-10-16 LAB
RAD ONC ARIA COURSE ID: NORMAL
RAD ONC ARIA COURSE INTENT: NORMAL
RAD ONC ARIA COURSE LAST TREATMENT DATE: NORMAL
RAD ONC ARIA COURSE START DATE: NORMAL
RAD ONC ARIA COURSE TREATMENT ELAPSED DAYS: 2
RAD ONC ARIA FIRST TREATMENT DATE: NORMAL
RAD ONC ARIA PLAN FRACTIONS TREATED TO DATE: 2
RAD ONC ARIA PLAN ID: NORMAL
RAD ONC ARIA PLAN PRESCRIBED DOSE PER FRACTION: 6 GY
RAD ONC ARIA PLAN PRIMARY REFERENCE POINT: NORMAL
RAD ONC ARIA PLAN TOTAL FRACTIONS PRESCRIBED: 5
RAD ONC ARIA PLAN TOTAL PRESCRIBED DOSE: 3000 CGY
RAD ONC ARIA REFERENCE POINT DOSAGE GIVEN TO DATE: 12 GY
RAD ONC ARIA REFERENCE POINT ID: NORMAL
RAD ONC ARIA REFERENCE POINT SESSION DOSAGE GIVEN: 6 GY

## 2024-10-16 PROCEDURE — 77385: CPT | Performed by: RADIOLOGY

## 2024-10-16 PROCEDURE — 77014 CHG CT GUIDANCE RADIATION THERAPY FLDS PLACEMENT: CPT | Performed by: RADIOLOGY

## 2024-10-18 ENCOUNTER — HOSPITAL ENCOUNTER (OUTPATIENT)
Dept: RADIATION ONCOLOGY | Facility: HOSPITAL | Age: 69
Discharge: HOME | End: 2024-10-18

## 2024-10-18 LAB
RAD ONC ARIA COURSE ID: NORMAL
RAD ONC ARIA COURSE INTENT: NORMAL
RAD ONC ARIA COURSE LAST TREATMENT DATE: NORMAL
RAD ONC ARIA COURSE START DATE: NORMAL
RAD ONC ARIA COURSE TREATMENT ELAPSED DAYS: 4
RAD ONC ARIA FIRST TREATMENT DATE: NORMAL
RAD ONC ARIA PLAN FRACTIONS TREATED TO DATE: 3
RAD ONC ARIA PLAN ID: NORMAL
RAD ONC ARIA PLAN PRESCRIBED DOSE PER FRACTION: 6 GY
RAD ONC ARIA PLAN PRIMARY REFERENCE POINT: NORMAL
RAD ONC ARIA PLAN TOTAL FRACTIONS PRESCRIBED: 5
RAD ONC ARIA PLAN TOTAL PRESCRIBED DOSE: 3000 CGY
RAD ONC ARIA REFERENCE POINT DOSAGE GIVEN TO DATE: 18 GY
RAD ONC ARIA REFERENCE POINT ID: NORMAL
RAD ONC ARIA REFERENCE POINT SESSION DOSAGE GIVEN: 6 GY

## 2024-10-18 PROCEDURE — 77336 RADIATION PHYSICS CONSULT: CPT | Performed by: RADIOLOGY

## 2024-10-18 PROCEDURE — 77385: CPT | Performed by: RADIOLOGY

## 2024-10-18 PROCEDURE — 77014 CHG CT GUIDANCE RADIATION THERAPY FLDS PLACEMENT: CPT | Performed by: RADIOLOGY

## 2024-10-21 ENCOUNTER — RADIATION ONCOLOGY WEEKLY ASSESSMENT (OUTPATIENT)
Dept: RADIATION ONCOLOGY | Facility: HOSPITAL | Age: 69
End: 2024-10-21
Payer: COMMERCIAL

## 2024-10-21 ENCOUNTER — HOSPITAL ENCOUNTER (OUTPATIENT)
Dept: RADIATION ONCOLOGY | Facility: HOSPITAL | Age: 69
Discharge: HOME | End: 2024-10-21
Payer: COMMERCIAL

## 2024-10-21 VITALS
WEIGHT: 153 LBS | OXYGEN SATURATION: 99 % | SYSTOLIC BLOOD PRESSURE: 146 MMHG | BODY MASS INDEX: 27.11 KG/M2 | DIASTOLIC BLOOD PRESSURE: 96 MMHG | HEART RATE: 126 BPM

## 2024-10-21 DIAGNOSIS — D05.11 DUCTAL CARCINOMA IN SITU (DCIS) OF RIGHT BREAST: Primary | ICD-10-CM

## 2024-10-21 LAB
RAD ONC ARIA COURSE ID: NORMAL
RAD ONC ARIA COURSE INTENT: NORMAL
RAD ONC ARIA COURSE LAST TREATMENT DATE: NORMAL
RAD ONC ARIA COURSE START DATE: NORMAL
RAD ONC ARIA COURSE TREATMENT ELAPSED DAYS: 7
RAD ONC ARIA FIRST TREATMENT DATE: NORMAL
RAD ONC ARIA PLAN FRACTIONS TREATED TO DATE: 4
RAD ONC ARIA PLAN ID: NORMAL
RAD ONC ARIA PLAN PRESCRIBED DOSE PER FRACTION: 6 GY
RAD ONC ARIA PLAN PRIMARY REFERENCE POINT: NORMAL
RAD ONC ARIA PLAN TOTAL FRACTIONS PRESCRIBED: 5
RAD ONC ARIA PLAN TOTAL PRESCRIBED DOSE: 3000 CGY
RAD ONC ARIA REFERENCE POINT DOSAGE GIVEN TO DATE: 24 GY
RAD ONC ARIA REFERENCE POINT ID: NORMAL
RAD ONC ARIA REFERENCE POINT SESSION DOSAGE GIVEN: 6 GY

## 2024-10-21 PROCEDURE — 77014 CHG CT GUIDANCE RADIATION THERAPY FLDS PLACEMENT: CPT | Performed by: RADIOLOGY

## 2024-10-21 PROCEDURE — 77385: CPT | Performed by: RADIOLOGY

## 2024-10-23 ENCOUNTER — HOSPITAL ENCOUNTER (OUTPATIENT)
Dept: RADIATION ONCOLOGY | Facility: HOSPITAL | Age: 69
Discharge: HOME | End: 2024-10-23

## 2024-10-23 LAB
RAD ONC ARIA COURSE ID: NORMAL
RAD ONC ARIA COURSE INTENT: NORMAL
RAD ONC ARIA COURSE LAST TREATMENT DATE: NORMAL
RAD ONC ARIA COURSE START DATE: NORMAL
RAD ONC ARIA COURSE TREATMENT ELAPSED DAYS: 9
RAD ONC ARIA FIRST TREATMENT DATE: NORMAL
RAD ONC ARIA PLAN FRACTIONS TREATED TO DATE: 5
RAD ONC ARIA PLAN ID: NORMAL
RAD ONC ARIA PLAN PRESCRIBED DOSE PER FRACTION: 6 GY
RAD ONC ARIA PLAN PRIMARY REFERENCE POINT: NORMAL
RAD ONC ARIA PLAN TOTAL FRACTIONS PRESCRIBED: 5
RAD ONC ARIA PLAN TOTAL PRESCRIBED DOSE: 3000 CGY
RAD ONC ARIA REFERENCE POINT DOSAGE GIVEN TO DATE: 30 GY
RAD ONC ARIA REFERENCE POINT ID: NORMAL
RAD ONC ARIA REFERENCE POINT SESSION DOSAGE GIVEN: 6 GY

## 2024-10-23 PROCEDURE — 77385: CPT | Performed by: RADIOLOGY

## 2024-10-23 PROCEDURE — 77336 RADIATION PHYSICS CONSULT: CPT | Performed by: RADIOLOGY

## 2024-10-23 PROCEDURE — 77014 CHG CT GUIDANCE RADIATION THERAPY FLDS PLACEMENT: CPT | Performed by: RADIOLOGY

## 2024-10-24 LAB
RAD ONC ARIA COURSE END DATE: NORMAL
RAD ONC ARIA COURSE ID: NORMAL
RAD ONC ARIA COURSE INTENT: NORMAL
RAD ONC ARIA COURSE LAST TREATMENT DATE: NORMAL
RAD ONC ARIA COURSE START DATE: NORMAL
RAD ONC ARIA COURSE TREATMENT ELAPSED DAYS: 9
RAD ONC ARIA FIRST TREATMENT DATE: NORMAL
RAD ONC ARIA PLAN FRACTIONS TREATED TO DATE: 5
RAD ONC ARIA PLAN ID: NORMAL
RAD ONC ARIA PLAN NAME: NORMAL
RAD ONC ARIA PLAN PRESCRIBED DOSE PER FRACTION: 6 GY
RAD ONC ARIA PLAN PRIMARY REFERENCE POINT: NORMAL
RAD ONC ARIA PLAN TOTAL FRACTIONS PRESCRIBED: 5
RAD ONC ARIA PLAN TOTAL PRESCRIBED DOSE: 3000 CGY
RAD ONC ARIA REFERENCE POINT DOSAGE GIVEN TO DATE: 30 GY
RAD ONC ARIA REFERENCE POINT ID: NORMAL

## 2024-11-05 ENCOUNTER — PATIENT OUTREACH (OUTPATIENT)
Dept: OTHER | Facility: HOSPITAL | Age: 69
End: 2024-11-05
Payer: COMMERCIAL

## 2024-11-05 NOTE — PROGRESS NOTES
Called MsMirta Tavia to see how she was doing. Attempted to leave a message with my contact information but the mailbox was full. Will mail survivorship information and ask that she call back at her convenience.

## 2024-11-05 NOTE — PROGRESS NOTES
Radiation Oncology  On-Treatment Note      Patient: Emilia Squires    MRN: [id number]    Attending Physician: Lexi Esposito MD     Diagnosis:     ICD-10-CM ICD-9-CM   1. Ductal carcinoma in situ (DCIS) of right breast  D05.11 233.0       Radiation Therapy Visit:  Continue radiation therapy, Dosimetry plan remains acceptable, Films reviewed and remains acceptable, Pain assessed, Pain management planned, Radiation dose schedule reviewed and remains acceptable, Radiation technique remains acceptable, and Symptoms within expected range    Radiation Treatments       Active   Reference Points   RX: Rt Breast   Most recent treatment: Dose given: -- (on 10/23/2024)   Total: Dose given: 3,000 cGy   Elapsed Days: 9                    Fx 4/5 APBI right breast  Dose: 24/30Gy    Physical Examination:  Vitals: Blood pressure 146/96, pulse (!) 126, weight 69.4 kg (153 lb), SpO2 99%, not currently breastfeeding.  There were no vitals filed for this visit.    Restricted in physically strenuous activity but ambulatory and able to carry out work of a light or sedentary nature, e.g., light house work, office work = 1    We examined the relevant areas: yes  Findings are within the expected range for this stage of treatment: yes  -------------------------------------------------------------------------------------------------------------------    ACTION ITEMS:  Patient tolerating treatment well and as expected for this stage in their treatment        Lexi Esposito MD  Radiation Oncology

## 2024-11-22 ENCOUNTER — DOCUMENTATION (OUTPATIENT)
Dept: RADIATION ONCOLOGY | Facility: HOSPITAL | Age: 69
End: 2024-11-22
Payer: COMMERCIAL

## 2024-11-22 DIAGNOSIS — D05.11 DUCTAL CARCINOMA IN SITU (DCIS) OF RIGHT BREAST: Primary | ICD-10-CM

## 2024-11-22 NOTE — PROGRESS NOTES
Radiation Treatment Summary Note      Patient Name: Emilia Squires  : 1955    Attending Provider: Lexi Esposito MD      Diagnosis:     ICD-10-CM ICD-9-CM   1. Ductal carcinoma in situ (DCIS) of right breast  D05.11 233.0   Site: right breast tumor bed    Radiation Start Date: 10/14/24    Radiation Completion Date: 10/23/24      Prescription:     She received a dose of 30gy in 5 fractions using APBI with 6 mv photons     Final Delivered Dose Deviated From Initially Prescribed Dose: No    Concurrent Chemotherapy: No    Patient Tolerated Treatment Without Unexpected Side Effects/Complications: Yes    ECOG: Fully active, able to carry on all pre-disease performance without restriction = 0    Pain Management Plan: None Indicated/PRN OTC    Follow-Up Plan: 3 months    Imaging Ordered for Follow-Up: None/NA        Lexi Esposito MD

## 2024-12-12 ENCOUNTER — TELEPHONE (OUTPATIENT)
Dept: INTERNAL MEDICINE | Facility: CLINIC | Age: 69
End: 2024-12-12
Payer: COMMERCIAL

## 2024-12-12 NOTE — TELEPHONE ENCOUNTER
Left voicemail for pt to call back and let us know if she plans to get labs completed that javier garza ordered if she wants us to cancel them     Hub okay to ask patient

## 2025-02-18 ENCOUNTER — TELEPHONE (OUTPATIENT)
Dept: RADIATION ONCOLOGY | Facility: HOSPITAL | Age: 70
End: 2025-02-18
Payer: COMMERCIAL

## 2025-02-19 ENCOUNTER — TELEPHONE (OUTPATIENT)
Dept: RADIATION ONCOLOGY | Facility: HOSPITAL | Age: 70
End: 2025-02-19
Payer: COMMERCIAL

## 2025-02-19 ENCOUNTER — TELEPHONE (OUTPATIENT)
Dept: INTERNAL MEDICINE | Facility: CLINIC | Age: 70
End: 2025-02-19
Payer: COMMERCIAL

## 2025-02-19 ENCOUNTER — DOCUMENTATION (OUTPATIENT)
Dept: RADIATION ONCOLOGY | Facility: HOSPITAL | Age: 70
End: 2025-02-19
Payer: COMMERCIAL

## 2025-02-19 NOTE — PROGRESS NOTES
Spoke with patients . Mrs Squires thought she rescheduled her appointment . She is unable to reschedule inform her  we would tomorrow to reschedule the appointment

## 2025-02-26 ENCOUNTER — APPOINTMENT (OUTPATIENT)
Dept: CT IMAGING | Facility: HOSPITAL | Age: 70
End: 2025-02-26
Payer: COMMERCIAL

## 2025-02-26 ENCOUNTER — TELEPHONE (OUTPATIENT)
Dept: RADIATION ONCOLOGY | Facility: HOSPITAL | Age: 70
End: 2025-02-26
Payer: COMMERCIAL

## 2025-02-26 ENCOUNTER — HOSPITAL ENCOUNTER (EMERGENCY)
Facility: HOSPITAL | Age: 70
Discharge: HOME | End: 2025-02-27
Attending: EMERGENCY MEDICINE
Payer: COMMERCIAL

## 2025-02-26 DIAGNOSIS — E11.65 TYPE 2 DIABETES MELLITUS WITH HYPERGLYCEMIA, WITHOUT LONG-TERM CURRENT USE OF INSULIN: Chronic | ICD-10-CM

## 2025-02-26 DIAGNOSIS — R29.6 UNWITNESSED FALL: Primary | ICD-10-CM

## 2025-02-26 DIAGNOSIS — F10.929 ALCOHOLIC INTOXICATION WITH COMPLICATION: ICD-10-CM

## 2025-02-26 LAB
ALBUMIN SERPL-MCNC: 3 G/DL (ref 3.5–5.2)
ALBUMIN/GLOB SERPL: 0.9 G/DL
ALP SERPL-CCNC: 107 U/L (ref 39–117)
ALT SERPL W P-5'-P-CCNC: 46 U/L (ref 1–33)
ANION GAP SERPL CALCULATED.3IONS-SCNC: 15.9 MMOL/L (ref 5–15)
AST SERPL-CCNC: 78 U/L (ref 1–32)
BASOPHILS # BLD MANUAL: 0.1 10*3/MM3 (ref 0–0.2)
BASOPHILS NFR BLD MANUAL: 2 % (ref 0–1.5)
BILIRUB SERPL-MCNC: 0.5 MG/DL (ref 0–1.2)
BUN SERPL-MCNC: 10 MG/DL (ref 8–23)
BUN/CREAT SERPL: 15.6 (ref 7–25)
CALCIUM SPEC-SCNC: 9.1 MG/DL (ref 8.6–10.5)
CHLORIDE SERPL-SCNC: 100 MMOL/L (ref 98–107)
CO2 SERPL-SCNC: 18.1 MMOL/L (ref 22–29)
CREAT SERPL-MCNC: 0.64 MG/DL (ref 0.57–1)
DEPRECATED RDW RBC AUTO: 58.6 FL (ref 37–54)
EGFRCR SERPLBLD CKD-EPI 2021: 95.8 ML/MIN/1.73
EOSINOPHIL # BLD MANUAL: 0.15 10*3/MM3 (ref 0–0.4)
EOSINOPHIL NFR BLD MANUAL: 3 % (ref 0.3–6.2)
ERYTHROCYTE [DISTWIDTH] IN BLOOD BY AUTOMATED COUNT: 15.1 % (ref 12.3–15.4)
ETHANOL BLD-MCNC: 216 MG/DL (ref 0–10)
ETHANOL UR QL: 0.22 %
GLOBULIN UR ELPH-MCNC: 3.2 GM/DL
GLUCOSE SERPL-MCNC: 148 MG/DL (ref 65–99)
HCT VFR BLD AUTO: 36.2 % (ref 34–46.6)
HGB BLD-MCNC: 12.1 G/DL (ref 12–15.9)
INR PPP: 1.18 (ref 0.9–1.1)
LYMPHOCYTES # BLD MANUAL: 1.1 10*3/MM3 (ref 0.7–3.1)
LYMPHOCYTES NFR BLD MANUAL: 4 % (ref 5–12)
MACROCYTES BLD QL SMEAR: ABNORMAL
MCH RBC QN AUTO: 36.9 PG (ref 26.6–33)
MCHC RBC AUTO-ENTMCNC: 33.4 G/DL (ref 31.5–35.7)
MCV RBC AUTO: 110.4 FL (ref 79–97)
MONOCYTES # BLD: 0.2 10*3/MM3 (ref 0.1–0.9)
NEUTROPHILS # BLD AUTO: 3.4 10*3/MM3 (ref 1.7–7)
NEUTROPHILS NFR BLD MANUAL: 68.7 % (ref 42.7–76)
NRBC BLD AUTO-RTO: 0 /100 WBC (ref 0–0.2)
PLAT MORPH BLD: NORMAL
PLATELET # BLD AUTO: 207 10*3/MM3 (ref 140–450)
PMV BLD AUTO: 10 FL (ref 6–12)
POTASSIUM SERPL-SCNC: 4.4 MMOL/L (ref 3.5–5.2)
PROT SERPL-MCNC: 6.2 G/DL (ref 6–8.5)
PROTHROMBIN TIME: 14.9 SECONDS (ref 11.7–14.2)
RBC # BLD AUTO: 3.28 10*6/MM3 (ref 3.77–5.28)
SODIUM SERPL-SCNC: 134 MMOL/L (ref 136–145)
VARIANT LYMPHS NFR BLD MANUAL: 22.2 % (ref 19.6–45.3)
WBC MORPH BLD: NORMAL
WBC NRBC COR # BLD AUTO: 4.95 10*3/MM3 (ref 3.4–10.8)

## 2025-02-26 PROCEDURE — 85007 BL SMEAR W/DIFF WBC COUNT: CPT | Performed by: EMERGENCY MEDICINE

## 2025-02-26 PROCEDURE — 85025 COMPLETE CBC W/AUTO DIFF WBC: CPT | Performed by: EMERGENCY MEDICINE

## 2025-02-26 PROCEDURE — 99284 EMERGENCY DEPT VISIT MOD MDM: CPT

## 2025-02-26 PROCEDURE — 85610 PROTHROMBIN TIME: CPT | Performed by: EMERGENCY MEDICINE

## 2025-02-26 PROCEDURE — 70450 CT HEAD/BRAIN W/O DYE: CPT

## 2025-02-26 PROCEDURE — 80053 COMPREHEN METABOLIC PANEL: CPT | Performed by: EMERGENCY MEDICINE

## 2025-02-26 PROCEDURE — 82077 ASSAY SPEC XCP UR&BREATH IA: CPT | Performed by: EMERGENCY MEDICINE

## 2025-02-26 RX ORDER — SODIUM CHLORIDE 0.9 % (FLUSH) 0.9 %
10 SYRINGE (ML) INJECTION AS NEEDED
Status: DISCONTINUED | OUTPATIENT
Start: 2025-02-26 | End: 2025-02-27 | Stop reason: HOSPADM

## 2025-02-26 NOTE — TELEPHONE ENCOUNTER
Called pt to reschedule 3/7 appt at 9:45am with Dr. Esposito due to her being out of the office that day. Pt did not answer, LVM

## 2025-02-27 ENCOUNTER — TELEPHONE (OUTPATIENT)
Dept: SURGERY | Facility: CLINIC | Age: 70
End: 2025-02-27
Payer: COMMERCIAL

## 2025-02-27 VITALS
RESPIRATION RATE: 14 BRPM | HEART RATE: 93 BPM | DIASTOLIC BLOOD PRESSURE: 79 MMHG | SYSTOLIC BLOOD PRESSURE: 133 MMHG | TEMPERATURE: 97.5 F | OXYGEN SATURATION: 97 %

## 2025-02-27 NOTE — ED PROVIDER NOTES
EMERGENCY DEPARTMENT ENCOUNTER  Room Number:  21/21  PCP: Evgeny Andrew III [id number]  Independent Historians: Patient, EMS who brought patient      HPI:  Chief Complaint: had concerns including Alcohol Intoxication and Fall.     A complete HPI/ROS/PMH/PSH/SH/FH are unobtainable due to:   Chronic or social conditions impacting patient care (Social Determinants of Health):       Context: Emilia Squires is a 69 y.o. female with a medical history of breast cancer, diabetes who presents to the ED c/o acute trip and fall.  Patient was outside and tripped and could not get up.  She was on the ground for about 15 minutes before EMS got there and brought her in.  She denies any injury.  She does admit to drinking several glasses of wine tonight.  Denies recent illness.  Denies chest pain or trouble breathing.      Review of prior external notes (non-ED) -and- Review of prior external test results outside of this encounter:   I reviewed prior medical records and the patient was hospitalized in 2022 with hyperglycemia.  She is currently being followed in radiation oncology for breast cancer.      Prescription drug monitoring program review:         PAST MEDICAL HISTORY  Active Ambulatory Problems     Diagnosis Date Noted    AA (alcohol abuse) 08/30/2022    Benign essential HTN 08/30/2022    Blues 08/30/2022    Hyperlipidemia 04/19/2013    Type 2 diabetes mellitus with hyperglycemia, without long-term current use of insulin 10/26/2022    Vitamin D deficiency 06/14/2024    Breast neoplasm, Tis (LCIS), right 07/15/2024    Ductal carcinoma in situ (DCIS) of right breast 08/28/2024     Resolved Ambulatory Problems     Diagnosis Date Noted    Hyperglycemia 10/13/2022    Controlled type 2 diabetes mellitus with hyperglycemia, without long-term current use of insulin 10/26/2022     Past Medical History:   Diagnosis Date    Acquired coagulation factor deficiency     Alcohol abuse     Depression     Diabetes mellitus      Diverticulitis     Hypertension          PAST SURGICAL HISTORY  Past Surgical History:   Procedure Laterality Date    APPENDECTOMY      BREAST BIOPSY Left     BREAST LUMPECTOMY Right 8/12/2024    Procedure: BREAST LUMPECTOMY WITH NEEDLE LOCALIZATION;  Surgeon: Jocelyn Mendez MD;  Location: [facility] OR;  Service: General;  Laterality: Right;    ILEOSTOMY CLOSURE           FAMILY HISTORY  Family History   Problem Relation Age of Onset    Ovarian cancer Mother     Diabetes Father     Colon cancer Father     Diabetes Brother     No Known Problems Daughter     Malig Hyperthermia Neg Hx          SOCIAL HISTORY  Social History     Socioeconomic History    Marital status:    Tobacco Use    Smoking status: Never    Smokeless tobacco: Never   Vaping Use    Vaping status: Never Used   Substance and Sexual Activity    Alcohol use: Yes     Alcohol/week: 2.0 - 3.0 standard drinks of alcohol     Types: 2 - 3 Glasses of wine per week     Comment: daily wine    Drug use: Never    Sexual activity: Defer         ALLERGIES  Penicillins      REVIEW OF SYSTEMS  Review of Systems   Constitutional:  Negative for fever.   Respiratory:  Negative for shortness of breath.    Cardiovascular:  Negative for chest pain.   All other systems reviewed and are negative.    Included in HPI  All systems reviewed and negative except for those discussed in HPI.      PHYSICAL EXAM    I have reviewed the triage vital signs and nursing notes.    ED Triage Vitals [02/26/25 2141]   Temp Heart Rate Resp BP SpO2   97.5 °F (36.4 °C) 94 14 146/85 99 %      Temp src Heart Rate Source Patient Position BP Location FiO2 (%)   -- -- -- -- --       Physical Exam  GENERAL: Alert well-appearing female in no obvious distress.  Blood pressure, heart rate and O2 sats unremarkable  SKIN: Warm, dry  HENT: Normocephalic, atraumatic  EYES: no scleral icterus  CV: regular rhythm, regular rate-no murmur  RESPIRATORY: normal effort, lungs clear-O2 sats upper 90s room  air  ABDOMEN: soft, nontender, nondistended  MUSCULOSKELETAL: no deformity  NEURO: alert, moves all extremities, follows commands      LAB RESULTS  Recent Results (from the past 24 hours)   Comprehensive Metabolic Panel    Collection Time: 02/26/25 10:10 PM    Specimen: Blood   Result Value Ref Range    Glucose 148 (H) 65 - 99 mg/dL    BUN 10 8 - 23 mg/dL    Creatinine 0.64 0.57 - 1.00 mg/dL    Sodium 134 (L) 136 - 145 mmol/L    Potassium 4.4 3.5 - 5.2 mmol/L    Chloride 100 98 - 107 mmol/L    CO2 18.1 (L) 22.0 - 29.0 mmol/L    Calcium 9.1 8.6 - 10.5 mg/dL    Total Protein 6.2 6.0 - 8.5 g/dL    Albumin 3.0 (L) 3.5 - 5.2 g/dL    ALT (SGPT) 46 (H) 1 - 33 U/L    AST (SGOT) 78 (H) 1 - 32 U/L    Alkaline Phosphatase 107 39 - 117 U/L    Total Bilirubin 0.5 0.0 - 1.2 mg/dL    Globulin 3.2 gm/dL    A/G Ratio 0.9 g/dL    BUN/Creatinine Ratio 15.6 7.0 - 25.0    Anion Gap 15.9 (H) 5.0 - 15.0 mmol/L    eGFR 95.8 >60.0 mL/min/1.73   Ethanol    Collection Time: 02/26/25 10:10 PM    Specimen: Blood   Result Value Ref Range    Ethanol 216 (H) 0 - 10 mg/dL    Ethanol % 0.216 %   Protime-INR    Collection Time: 02/26/25 10:10 PM    Specimen: Blood   Result Value Ref Range    Protime 14.9 (H) 11.7 - 14.2 Seconds    INR 1.18 (H) 0.90 - 1.10   CBC Auto Differential    Collection Time: 02/26/25 10:10 PM    Specimen: Blood   Result Value Ref Range    WBC 4.95 3.40 - 10.80 10*3/mm3    RBC 3.28 (L) 3.77 - 5.28 10*6/mm3    Hemoglobin 12.1 12.0 - 15.9 g/dL    Hematocrit 36.2 34.0 - 46.6 %    .4 (H) 79.0 - 97.0 fL    MCH 36.9 (H) 26.6 - 33.0 pg    MCHC 33.4 31.5 - 35.7 g/dL    RDW 15.1 12.3 - 15.4 %    RDW-SD 58.6 (H) 37.0 - 54.0 fl    MPV 10.0 6.0 - 12.0 fL    Platelets 207 140 - 450 10*3/mm3    nRBC 0.0 0.0 - 0.2 /100 WBC   Manual Differential    Collection Time: 02/26/25 10:10 PM    Specimen: Blood   Result Value Ref Range    Neutrophil % 68.7 42.7 - 76.0 %    Lymphocyte % 22.2 19.6 - 45.3 %    Monocyte % 4.0 (L) 5.0 - 12.0 %     Eosinophil % 3.0 0.3 - 6.2 %    Basophil % 2.0 (H) 0.0 - 1.5 %    Neutrophils Absolute 3.40 1.70 - 7.00 10*3/mm3    Lymphocytes Absolute 1.10 0.70 - 3.10 10*3/mm3    Monocytes Absolute 0.20 0.10 - 0.90 10*3/mm3    Eosinophils Absolute 0.15 0.00 - 0.40 10*3/mm3    Basophils Absolute 0.10 0.00 - 0.20 10*3/mm3    Macrocytes Mod/2+ None Seen    WBC Morphology Normal Normal    Platelet Morphology Normal Normal         RADIOLOGY  CT Head Without Contrast    Result Date: 2/26/2025  HEAD CT WITHOUT CONTRAST  REASON:  Unwitnessed fall, intoxicated  COMPARISON STUDIES:  None Available.  TECHNIQUE:  Axial images were acquired from the skull base to vertex without contrast, including multiplanar reformats, per standard departmental protocol.    Radiation dose reduction techniques were utilized, including automated exposure control, and exposure modulation based on body size.  FINDINGS:  There is no CT evidence of acute intracranial hemorrhage, mass, or infarct. There is volume loss, but there is no evidence of hydrocephalus or extra-axial fluid collection.  There are nonspecific white matter changes, but there is no acute intracranial abnormality.  There is a left parietal scalp hematoma, but there is no acute bony abnormality.       No evidence of acute intracranial abnormality. Left parietal scalp hematoma.        This report was finalized on 2/26/2025 10:36 PM by Dr. Chico Almendarez M.D on Workstation: [id number]         MEDICATIONS GIVEN IN ER  Medications   sodium chloride 0.9 % flush 10 mL (has no administration in time range)         ORDERS PLACED DURING THIS VISIT:  Orders Placed This Encounter   Procedures    CT Head Without Contrast    Comprehensive Metabolic Panel    Ethanol    Protime-INR    CBC Auto Differential    Manual Differential    Ambulate patient    Insert Peripheral IV    CBC & Differential         OUTPATIENT MEDICATION MANAGEMENT:  Current Facility-Administered Medications Ordered in Epic   Medication  Dose Route Frequency Provider Last Rate Last Admin    sodium chloride 0.9 % flush 10 mL  10 mL Intravenous PRN Leoncio Ley MD         Current Outpatient Medications Ordered in Epic   Medication Sig Dispense Refill    Blood Glucose Monitoring Suppl (OneTouch Verio Flex System) w/Device kit Use to test blood glucose up to four times daily as needed. 1 kit 0    Cholecalciferol (VITAMIN D3) 5000 UNITS capsule capsule Take  by mouth.      folic acid (FOLVITE) 1 MG tablet       glipizide (GLUCOTROL) 5 MG tablet Take 1 tablet by mouth 2 (Two) Times a Day Before Meals. 180 tablet 1    glucose blood test strip Use to test blood glucose up to four times daily as needed. 100 each 0    Lancets misc Use to test blood glucose up to four times daily as needed. 100 each 0    losartan (COZAAR) 25 MG tablet Take 1 tablet by mouth Daily. 90 tablet 1    Multiple Vitamin (MULTI VITAMIN DAILY PO) Take 1 tablet by mouth Daily. HOLD PER MD MCDONALD      naltrexone (DEPADE) 50 MG tablet [[id number]]: NALTREXONE, 50 MG X 1 TABLET(S) , ORAL, TABLET, ONCE A DAY IN THE MORNING, FOR 30 DAYS      traZODone (DESYREL) 100 MG tablet            PROCEDURES  Procedures            PROGRESS, DATA ANALYSIS, CONSULTS, AND MEDICAL DECISION MAKING  All labs have been independently interpreted by me.  All radiology studies have been reviewed by me. All EKG's have been independently viewed and interpreted by me.  Discussion below represents my analysis of pertinent findings related to patient's condition, differential diagnosis, treatment plan and final disposition.    Differential diagnosis includes but is not limited to alcohol intoxication, head injury, electrolyte disturbance, dehydration.      ED Course as of 02/27/25 0013   Wed Feb 26, 2025   2321 Alcohol level somewhat elevated at 216.    Blood work shows mild metabolic acidosis with bicarb of 18.  This could suggest some mild alcoholic ketoacidosis although patient has not had vomiting and is  well-appearing here.    CBC benign without evidence of infection or anemia. [DB]   2323 On repeat exam patient is well-appearing and answering questions appropriately. [DB]   2324 CT scan of the brain independently interpreted by me shows no obvious acute traumatic injury.    Official reading by Dr. Rogel is in agreement.  There is a small scalp hematoma. [DB]   Thu Feb 27, 2025   0008 Patient was able to walk okay on her own.    They did call patient's  who was at home.  I told her that she is somewhat intoxicated but safe for discharge.  CT scan and labs otherwise fairly unremarkable. [DB]      ED Course User Index  [DB] Leoncio Ley MD             AS OF 00:13 EST VITALS:    BP - 133/79  HR - 93  TEMP - 97.5 °F (36.4 °C)  O2 SATS - 97%    COMPLEXITY OF CARE  69-year-old female with history of diabetes, breast cancer presents after fall at home in her yard.  She states she had trouble getting up.  She does admit to drinking several glasses of wine tonight.    Please see ED workup above for my independent valuation interpretation of ED testing notable for the following:    CT scan of the brain without any obvious serious traumatic injury  Alcohol level elevated at 216 consistent with alcohol intoxication  Blood glucose of 148 and a known diabetic.  Renal function unremarkable  CBC benign without evidence of infection or anemia.    Patient was able to ambulate readily on her own and did not have evidence of any significant injury.  Suspect fall related to alcohol intoxication.  At this point patient looks to be safe to be discharged home.  I did call patient's  who will come pick her up.    DIAGNOSIS  Final diagnoses:   Unwitnessed fall   Alcoholic intoxication with complication   Type 2 diabetes mellitus with hyperglycemia, without long-term current use of insulin         DISPOSITION  ED Disposition       ED Disposition   Discharge    Condition   Stable    Comment   --                Please note  that portions of this document were completed with a voice recognition program.    Note Disclaimer: At [facility], we believe that sharing information builds trust and better relationships. You are receiving this note because you recently visited [facility]. It is possible you will see health information before a provider has talked with you about it. This kind of information can be easy to misunderstand. To help you fully understand what it means for your health, we urge you to discuss this note with your provider.         Leoncio Ley MD  02/27/25 0013

## 2025-02-27 NOTE — DISCHARGE INSTRUCTIONS
CT scan did not show any obvious serious injuries.  Blood work did show some alcohol intoxication.

## 2025-03-11 ENCOUNTER — TELEPHONE (OUTPATIENT)
Dept: RADIATION ONCOLOGY | Facility: HOSPITAL | Age: 70
End: 2025-03-11
Payer: COMMERCIAL

## 2025-03-12 ENCOUNTER — TELEPHONE (OUTPATIENT)
Dept: RADIATION ONCOLOGY | Facility: HOSPITAL | Age: 70
End: 2025-03-12
Payer: COMMERCIAL

## 2025-03-12 NOTE — TELEPHONE ENCOUNTER
LVM regarding PTS appt today with CHM at 915. PT was a no show. LVM asking PT to call office back to reschedule 3mo follow up

## 2025-03-12 NOTE — TELEPHONE ENCOUNTER
PT returned call regarding missed appt today 3/12 with CHM. She is rescheduled to see her tomorrow 3/13 at 915

## 2025-03-18 ENCOUNTER — TELEPHONE (OUTPATIENT)
Dept: RADIATION ONCOLOGY | Facility: HOSPITAL | Age: 70
End: 2025-03-18
Payer: COMMERCIAL

## 2025-03-19 ENCOUNTER — OFFICE VISIT (OUTPATIENT)
Dept: RADIATION ONCOLOGY | Facility: HOSPITAL | Age: 70
End: 2025-03-19
Payer: MEDICARE

## 2025-03-19 ENCOUNTER — TELEPHONE (OUTPATIENT)
Dept: INTERNAL MEDICINE | Facility: CLINIC | Age: 70
End: 2025-03-19
Payer: COMMERCIAL

## 2025-03-19 VITALS
HEART RATE: 70 BPM | SYSTOLIC BLOOD PRESSURE: 138 MMHG | WEIGHT: 142.6 LBS | BODY MASS INDEX: 25.27 KG/M2 | OXYGEN SATURATION: 98 % | DIASTOLIC BLOOD PRESSURE: 86 MMHG

## 2025-03-19 DIAGNOSIS — D05.11 DUCTAL CARCINOMA IN SITU (DCIS) OF RIGHT BREAST: Primary | ICD-10-CM

## 2025-03-19 PROCEDURE — G0463 HOSPITAL OUTPT CLINIC VISIT: HCPCS | Performed by: RADIOLOGY

## 2025-03-19 NOTE — TELEPHONE ENCOUNTER
Left Voice Mail.      RELAY.     Does patient want to get overdue labs done that are in chart? If so patient can call the office [phone] to schedule a lab appointment.     If patient would like to cancel labs please let us know.

## 2025-03-19 NOTE — PROGRESS NOTES
Cancer Staging   CC: 5 month follow up for dcis of right breast     S:   I had the pleasure of seeing Emilia Squires  [facility]. She is a 70 yo female with dcis of right breast ER NH positive s/p lumpectomy, pTisNx. She returns today for follow up now 5 months out from completion of her radiation therapy to the breast.  She completed a dose of 30gy in 5 fractions using APBI on 10/23/24.    She has been doing well since I last saw her.      Review of Systems   Constitutional:  Positive for fatigue.   Skin: Negative.    Psychiatric/Behavioral:  Positive for confusion.        Past Medical History:   Diagnosis Date    Acquired coagulation factor deficiency     Alcohol abuse     Depression     Diabetes mellitus     Diverticulitis     Hypertension          Past Surgical History:   Procedure Laterality Date    APPENDECTOMY      BREAST BIOPSY Left     BREAST LUMPECTOMY Right 8/12/2024    Procedure: BREAST LUMPECTOMY WITH NEEDLE LOCALIZATION;  Surgeon: Jocelyn Mendez MD;  Location: [facility];  Service: General;  Laterality: Right;    ILEOSTOMY CLOSURE           Social History     Socioeconomic History    Marital status:    Tobacco Use    Smoking status: Never    Smokeless tobacco: Never   Vaping Use    Vaping status: Never Used   Substance and Sexual Activity    Alcohol use: Yes     Alcohol/week: 2.0 - 3.0 standard drinks of alcohol     Types: 2 - 3 Glasses of wine per week     Comment: daily wine    Drug use: Never    Sexual activity: Defer         Family History   Problem Relation Age of Onset    Ovarian cancer Mother     Diabetes Father     Colon cancer Father     Diabetes Brother     No Known Problems Daughter     Malig Hyperthermia Neg Hx           Objective    Physical Exam  Constitutional:       Appearance: Normal appearance.   Chest:          Comments: No palpable masses in right breast, no skin changes  Neurological:      Mental Status: She is alert.         Current Outpatient  Medications on File Prior to Visit   Medication Sig Dispense Refill    glipizide (GLUCOTROL) 5 MG tablet Take 1 tablet by mouth 2 (Two) Times a Day Before Meals. 180 tablet 1    Blood Glucose Monitoring Suppl (OneTouch Verio Flex System) w/Device kit Use to test blood glucose up to four times daily as needed. 1 kit 0    Cholecalciferol (VITAMIN D3) 5000 UNITS capsule capsule Take  by mouth.      folic acid (FOLVITE) 1 MG tablet       glucose blood test strip Use to test blood glucose up to four times daily as needed. 100 each 0    Lancets misc Use to test blood glucose up to four times daily as needed. 100 each 0    losartan (COZAAR) 25 MG tablet Take 1 tablet by mouth Daily. 90 tablet 1    Multiple Vitamin (MULTI VITAMIN DAILY PO) Take 1 tablet by mouth Daily. HOLD PER MD MCDONALD      naltrexone (DEPADE) 50 MG tablet [[id number]]: NALTREXONE, 50 MG X 1 TABLET(S) , ORAL, TABLET, ONCE A DAY IN THE MORNING, FOR 30 DAYS      traZODone (DESYREL) 100 MG tablet        No current facility-administered medications on file prior to visit.       ALLERGIES:    Allergies   Allergen Reactions    Penicillins Other (See Comments)     THROAT SWELLS CLOSED/STHROAT ITCHING AND COUGH..Beta lactam allergy details  Antibiotic reaction: other (itchy throat and cough)  Age at reaction: adult  Dose to reaction time: (!) hours  Reason for antibiotic: other (COLON SURGERY)  Epinephrine required for reaction?: unknown  Tolerated antibiotics: unknown          /86   Pulse 70   Wt 64.7 kg (142 lb 9.6 oz)   SpO2 98%   BMI 25.27 kg/m²           No data to display                  Assessment & Plan     68 yo female with dcis of right breast ER NM positive s/p lumpectomy, pTisNx now 5 months out from radiation.  She will due for her yearly mammogram later this month or April and we will make sure this is ordered. .  She will have follow up with her surgeon dr. Mendez. She will have regular follow up with her medical oncologist.  I will see her  back in one year for follow up.  She knows to call for this appointment.      I personally spent greater than 25 minutes today assessing, managing, discussing and documenting my visit with the patient. That time includes review of records, imaging and pathology reports, obtaining my own history, performing a medically appropriate evaluation, counseling and educating the patient, discussing goals, logistics, alternatives and risks of my recommendations, surveillance options and potential outcomes. It also includes the time documenting the clinical information in the EMR and communicating my recommendations to the other involved physicians.              Thank you very much for allowing me to participate in the care of this very pleasant patient.    Sincerely,      Lexi Esposito MD

## 2025-03-20 ENCOUNTER — TELEPHONE (OUTPATIENT)
Dept: SURGERY | Facility: CLINIC | Age: 70
End: 2025-03-20
Payer: COMMERCIAL

## 2025-03-20 NOTE — TELEPHONE ENCOUNTER
----- Message from Jocelyn Mendez sent at 3/20/2025 11:58 AM EDT -----  Regarding: follow up apt  Kaitlyn Leblanc, this lady needs some follow-up with us. I am unsure if she's actually scheduled for the mammogram at [facility] but I thought it was scheduled when I saw her ....Can you help find out the details to get her set up again.She's hard to reach sometimes as she has a heavy and intermittent alcohol history....Thanks!

## 2025-03-20 NOTE — TELEPHONE ENCOUNTER
Spoke to pt and got her chava for [facility] on 04/15 @ 1030 for her scr mammo she will see us 09/11 @ 1120   Pt stated understanding

## 2025-04-01 ENCOUNTER — TELEPHONE (OUTPATIENT)
Dept: INTERNAL MEDICINE | Facility: CLINIC | Age: 70
End: 2025-04-01
Payer: COMMERCIAL

## 2025-04-01 NOTE — TELEPHONE ENCOUNTER
Request to cancel outstanding order:     [x] Lab   [] Imaging   [] Referral     Date order placed: 6/14/2024    Reason:   [] Duplicate  [] Patient declined and it is noted  [x] Patient has not scheduled (see below)   [] Other:     For patient that has not scheduled:     Patient was contacted 3 times:     1) Telephone Message on 12/2/204  2) Letter Mailed to Patient  on 1/21/2025   3) Telephone Message on 2/19/2025  4) Telephone Message on 3/19/2025      For [facility] seeing this [facility]: An attempt was made to contact you to schedule an order 3 times unsuccessfully.  If you feel this is in error, please contact your provider.

## (undated) DEVICE — ADHS SKIN SURG TISS VISC PREMIERPRO EXOFIN HI/VISC FAST/DRY

## (undated) DEVICE — TRAP FLD MINIVAC MEGADYNE 100ML

## (undated) DEVICE — PK UNIV COMPL 40

## (undated) DEVICE — SUT SILK 2/0 SH 30IN K833H

## (undated) DEVICE — GLV SURG BIOGEL LTX PF 7

## (undated) DEVICE — HYPODERMIC SAFETY NEEDLE: Brand: MONOJECT

## (undated) DEVICE — BANDAGE,GAUZE,BULKEE II,4.5"X4.1YD,STRL: Brand: MEDLINE

## (undated) DEVICE — ELECTRD BLD EZ CLN MOD XLNG 2.75IN

## (undated) DEVICE — STPLR SKIN VISISTAT WD 35CT

## (undated) DEVICE — ANTIBACTERIAL UNDYED BRAIDED (POLYGLACTIN 910), SYNTHETIC ABSORBABLE SUTURE: Brand: COATED VICRYL

## (undated) DEVICE — BNDG,ELSTC,MATRIX,STRL,6"X5YD,LF,HOOK&LP: Brand: MEDLINE

## (undated) DEVICE — PATIENT RETURN ELECTRODE, SINGLE-USE, CONTACT QUALITY MONITORING, ADULT, WITH 9FT CORD, FOR PATIENTS WEIGING OVER 33LBS. (15KG): Brand: MEGADYNE

## (undated) DEVICE — SPNG GZ WOVN 4X4IN 12PLY 10/BX STRL

## (undated) DEVICE — PAD,ABDOMINAL,8"X10",ST,LF: Brand: MEDLINE

## (undated) DEVICE — GLV SURG SENSICARE PI LF PF 7.5 GRN STRL

## (undated) DEVICE — SYR LL TP 10ML STRL

## (undated) DEVICE — APPL CHLORAPREP HI/LITE 26ML ORNG

## (undated) DEVICE — SUT MNCRYL PLS ANTIB UD 4/0 PS2 18IN

## (undated) DEVICE — LEGGINGS, PAIR, 31X48, STERILE: Brand: MEDLINE

## (undated) DEVICE — COVER,MAYO STAND,STERILE: Brand: MEDLINE